# Patient Record
Sex: FEMALE | NOT HISPANIC OR LATINO | URBAN - NONMETROPOLITAN AREA
[De-identification: names, ages, dates, MRNs, and addresses within clinical notes are randomized per-mention and may not be internally consistent; named-entity substitution may affect disease eponyms.]

---

## 2021-02-08 ENCOUNTER — HOSPITAL ENCOUNTER (OUTPATIENT)
Facility: HOSPITAL | Age: 71
Discharge: HOME | End: 2021-03-15
Attending: INTERNAL MEDICINE | Admitting: INTERNAL MEDICINE

## 2021-02-08 LAB
INR PPP: 1.55 (ref 0.91–1.09)
PROTHROMBIN TIME: 18.3 SECONDS (ref 11.9–14.6)

## 2021-02-08 PROCEDURE — 85610 PROTHROMBIN TIME: CPT | Performed by: INTERNAL MEDICINE

## 2021-02-08 RX ORDER — ZOLPIDEM TARTRATE 5 MG/1
10 TABLET ORAL NIGHTLY PRN
Status: ACTIVE | OUTPATIENT
Start: 2021-02-08 | End: 2021-02-15

## 2021-02-08 RX ORDER — FLUTICASONE PROPIONATE 50 MCG
1 SPRAY, SUSPENSION (ML) NASAL 2 TIMES DAILY PRN
Status: DISCONTINUED | OUTPATIENT
Start: 2021-02-08 | End: 2021-03-15 | Stop reason: HOSPADM

## 2021-02-08 RX ORDER — PROMETHAZINE HYDROCHLORIDE 25 MG/1
12.5 TABLET ORAL EVERY 6 HOURS PRN
Status: DISCONTINUED | OUTPATIENT
Start: 2021-02-08 | End: 2021-03-15 | Stop reason: HOSPADM

## 2021-02-08 RX ORDER — HYDROCHLOROTHIAZIDE 25 MG/1
25 TABLET ORAL DAILY
Status: DISCONTINUED | OUTPATIENT
Start: 2021-02-09 | End: 2021-03-15 | Stop reason: HOSPADM

## 2021-02-08 RX ORDER — BISACODYL 10 MG
10 SUPPOSITORY, RECTAL RECTAL DAILY PRN
Status: DISCONTINUED | OUTPATIENT
Start: 2021-02-08 | End: 2021-03-15 | Stop reason: HOSPADM

## 2021-02-08 RX ORDER — HYDROXYZINE HYDROCHLORIDE 25 MG/1
50 TABLET, FILM COATED ORAL 3 TIMES DAILY PRN
Status: DISCONTINUED | OUTPATIENT
Start: 2021-02-08 | End: 2021-03-15 | Stop reason: HOSPADM

## 2021-02-08 RX ORDER — SIMVASTATIN 20 MG
20 TABLET ORAL NIGHTLY
Status: DISCONTINUED | OUTPATIENT
Start: 2021-02-08 | End: 2021-02-08 | Stop reason: ALTCHOICE

## 2021-02-08 RX ORDER — FUROSEMIDE 40 MG/1
40 TABLET ORAL DAILY
Status: DISCONTINUED | OUTPATIENT
Start: 2021-02-09 | End: 2021-03-08

## 2021-02-08 RX ORDER — POLYETHYLENE GLYCOL 3350 17 G/17G
17 POWDER, FOR SOLUTION ORAL DAILY PRN
Status: DISCONTINUED | OUTPATIENT
Start: 2021-02-08 | End: 2021-03-15 | Stop reason: HOSPADM

## 2021-02-08 RX ORDER — ALBUTEROL SULFATE 90 UG/1
2 AEROSOL, METERED RESPIRATORY (INHALATION) EVERY 4 HOURS PRN
Status: DISCONTINUED | OUTPATIENT
Start: 2021-02-08 | End: 2021-03-15 | Stop reason: HOSPADM

## 2021-02-08 RX ORDER — ESCITALOPRAM OXALATE 20 MG/1
20 TABLET ORAL DAILY
Status: DISCONTINUED | OUTPATIENT
Start: 2021-02-09 | End: 2021-03-15 | Stop reason: HOSPADM

## 2021-02-08 RX ORDER — SODIUM CHLORIDE 0.9 % (FLUSH) 0.9 %
10 SYRINGE (ML) INJECTION EVERY 12 HOURS SCHEDULED
Status: DISCONTINUED | OUTPATIENT
Start: 2021-02-08 | End: 2021-02-12

## 2021-02-08 RX ORDER — OXYMETAZOLINE HYDROCHLORIDE 0.05 G/100ML
2 SPRAY NASAL 2 TIMES DAILY PRN
Status: DISCONTINUED | OUTPATIENT
Start: 2021-02-08 | End: 2021-03-15 | Stop reason: HOSPADM

## 2021-02-08 RX ORDER — LISINOPRIL 5 MG/1
10 TABLET ORAL
Status: DISCONTINUED | OUTPATIENT
Start: 2021-02-09 | End: 2021-03-15 | Stop reason: HOSPADM

## 2021-02-08 RX ORDER — ONDANSETRON 4 MG/1
4 TABLET, ORALLY DISINTEGRATING ORAL EVERY 6 HOURS PRN
Status: DISCONTINUED | OUTPATIENT
Start: 2021-02-08 | End: 2021-03-15 | Stop reason: HOSPADM

## 2021-02-08 RX ORDER — ACETAMINOPHEN 325 MG/1
650 TABLET ORAL EVERY 4 HOURS PRN
Status: DISCONTINUED | OUTPATIENT
Start: 2021-02-08 | End: 2021-03-15 | Stop reason: HOSPADM

## 2021-02-08 RX ORDER — SODIUM CHLORIDE 0.9 % (FLUSH) 0.9 %
10 SYRINGE (ML) INJECTION AS NEEDED
Status: DISCONTINUED | OUTPATIENT
Start: 2021-02-08 | End: 2021-02-12

## 2021-02-08 RX ORDER — ONDANSETRON 2 MG/ML
4 INJECTION INTRAMUSCULAR; INTRAVENOUS EVERY 6 HOURS PRN
Status: DISCONTINUED | OUTPATIENT
Start: 2021-02-08 | End: 2021-03-15 | Stop reason: HOSPADM

## 2021-02-08 RX ORDER — ATORVASTATIN CALCIUM 10 MG/1
10 TABLET, FILM COATED ORAL DAILY
Status: DISCONTINUED | OUTPATIENT
Start: 2021-02-08 | End: 2021-03-15 | Stop reason: HOSPADM

## 2021-02-08 RX ORDER — MORPHINE SULFATE 2 MG/ML
2 INJECTION, SOLUTION INTRAMUSCULAR; INTRAVENOUS EVERY 4 HOURS PRN
Status: ACTIVE | OUTPATIENT
Start: 2021-02-08 | End: 2021-02-15

## 2021-02-08 RX ORDER — NITROGLYCERIN 0.4 MG/1
0.4 TABLET SUBLINGUAL
Status: DISCONTINUED | OUTPATIENT
Start: 2021-02-08 | End: 2021-03-15 | Stop reason: HOSPADM

## 2021-02-08 RX ORDER — AMOXICILLIN 250 MG
1 CAPSULE ORAL DAILY PRN
Status: DISCONTINUED | OUTPATIENT
Start: 2021-02-08 | End: 2021-03-15 | Stop reason: HOSPADM

## 2021-02-08 RX ORDER — LANOLIN ALCOHOL/MO/W.PET/CERES
6 CREAM (GRAM) TOPICAL NIGHTLY
Status: DISCONTINUED | OUTPATIENT
Start: 2021-02-08 | End: 2021-03-15 | Stop reason: HOSPADM

## 2021-02-08 RX ORDER — ECHINACEA PURPUREA EXTRACT 125 MG
1 TABLET ORAL DAILY PRN
Status: DISCONTINUED | OUTPATIENT
Start: 2021-02-08 | End: 2021-03-15 | Stop reason: HOSPADM

## 2021-02-09 LAB
ALBUMIN SERPL-MCNC: 3.9 G/DL (ref 3.5–5.2)
ALBUMIN/GLOB SERPL: 1.3 G/DL
ALP SERPL-CCNC: 109 U/L (ref 39–117)
ALT SERPL W P-5'-P-CCNC: 54 U/L (ref 1–33)
ANION GAP SERPL CALCULATED.3IONS-SCNC: 8 MMOL/L (ref 5–15)
AST SERPL-CCNC: 55 U/L (ref 1–32)
BASOPHILS # BLD AUTO: 0.04 10*3/MM3 (ref 0–0.2)
BASOPHILS NFR BLD AUTO: 0.7 % (ref 0–1.5)
BILIRUB SERPL-MCNC: 0.4 MG/DL (ref 0–1.2)
BUN SERPL-MCNC: 10 MG/DL (ref 8–23)
BUN/CREAT SERPL: 17.2 (ref 7–25)
CALCIUM SPEC-SCNC: 9.4 MG/DL (ref 8.6–10.5)
CHLORIDE SERPL-SCNC: 103 MMOL/L (ref 98–107)
CO2 SERPL-SCNC: 31 MMOL/L (ref 22–29)
CREAT SERPL-MCNC: 0.58 MG/DL (ref 0.57–1)
DEPRECATED RDW RBC AUTO: 53.3 FL (ref 37–54)
EOSINOPHIL # BLD AUTO: 1.02 10*3/MM3 (ref 0–0.4)
EOSINOPHIL NFR BLD AUTO: 17.2 % (ref 0.3–6.2)
ERYTHROCYTE [DISTWIDTH] IN BLOOD BY AUTOMATED COUNT: 16.3 % (ref 12.3–15.4)
GFR SERPL CREATININE-BSD FRML MDRD: 102 ML/MIN/1.73
GFR SERPL CREATININE-BSD FRML MDRD: 124 ML/MIN/1.73
GLOBULIN UR ELPH-MCNC: 3 GM/DL
GLUCOSE SERPL-MCNC: 147 MG/DL (ref 65–99)
HCT VFR BLD AUTO: 35.6 % (ref 34–46.6)
HGB BLD-MCNC: 11.5 G/DL (ref 12–15.9)
IMM GRANULOCYTES # BLD AUTO: 0.02 10*3/MM3 (ref 0–0.05)
IMM GRANULOCYTES NFR BLD AUTO: 0.3 % (ref 0–0.5)
INR PPP: 1.51 (ref 0.91–1.09)
LYMPHOCYTES # BLD AUTO: 2.38 10*3/MM3 (ref 0.7–3.1)
LYMPHOCYTES NFR BLD AUTO: 40.1 % (ref 19.6–45.3)
MCH RBC QN AUTO: 28.5 PG (ref 26.6–33)
MCHC RBC AUTO-ENTMCNC: 32.3 G/DL (ref 31.5–35.7)
MCV RBC AUTO: 88.3 FL (ref 79–97)
MONOCYTES # BLD AUTO: 0.48 10*3/MM3 (ref 0.1–0.9)
MONOCYTES NFR BLD AUTO: 8.1 % (ref 5–12)
NEUTROPHILS NFR BLD AUTO: 1.99 10*3/MM3 (ref 1.7–7)
NEUTROPHILS NFR BLD AUTO: 33.6 % (ref 42.7–76)
NRBC BLD AUTO-RTO: 0 /100 WBC (ref 0–0.2)
PLATELET # BLD AUTO: 305 10*3/MM3 (ref 140–450)
PMV BLD AUTO: 9.7 FL (ref 6–12)
POTASSIUM SERPL-SCNC: 3.6 MMOL/L (ref 3.5–5.2)
PREALB SERPL-MCNC: 21.8 MG/DL (ref 20–40)
PROT SERPL-MCNC: 6.9 G/DL (ref 6–8.5)
PROTHROMBIN TIME: 17.9 SECONDS (ref 11.9–14.6)
RBC # BLD AUTO: 4.03 10*6/MM3 (ref 3.77–5.28)
SODIUM SERPL-SCNC: 142 MMOL/L (ref 136–145)
WBC # BLD AUTO: 5.93 10*3/MM3 (ref 3.4–10.8)

## 2021-02-09 PROCEDURE — 97166 OT EVAL MOD COMPLEX 45 MIN: CPT | Performed by: OCCUPATIONAL THERAPIST

## 2021-02-09 PROCEDURE — 84134 ASSAY OF PREALBUMIN: CPT | Performed by: INTERNAL MEDICINE

## 2021-02-09 PROCEDURE — 97161 PT EVAL LOW COMPLEX 20 MIN: CPT | Performed by: PHYSICAL THERAPIST

## 2021-02-09 PROCEDURE — 97535 SELF CARE MNGMENT TRAINING: CPT | Performed by: OCCUPATIONAL THERAPIST

## 2021-02-09 PROCEDURE — 80053 COMPREHEN METABOLIC PANEL: CPT | Performed by: INTERNAL MEDICINE

## 2021-02-09 PROCEDURE — 85025 COMPLETE CBC W/AUTO DIFF WBC: CPT | Performed by: INTERNAL MEDICINE

## 2021-02-09 PROCEDURE — 85610 PROTHROMBIN TIME: CPT | Performed by: INTERNAL MEDICINE

## 2021-02-09 PROCEDURE — 97530 THERAPEUTIC ACTIVITIES: CPT | Performed by: PHYSICAL THERAPIST

## 2021-02-10 LAB
HOLD SPECIMEN: NORMAL
INR PPP: 1.7 (ref 0.91–1.09)
PROTHROMBIN TIME: 19.7 SECONDS (ref 11.9–14.6)

## 2021-02-10 PROCEDURE — 85610 PROTHROMBIN TIME: CPT | Performed by: INTERNAL MEDICINE

## 2021-02-10 PROCEDURE — 97530 THERAPEUTIC ACTIVITIES: CPT

## 2021-02-10 PROCEDURE — 97535 SELF CARE MNGMENT TRAINING: CPT

## 2021-02-11 LAB
INR PPP: 1.51 (ref 0.91–1.09)
PROTHROMBIN TIME: 17.9 SECONDS (ref 11.9–14.6)

## 2021-02-11 PROCEDURE — 97116 GAIT TRAINING THERAPY: CPT

## 2021-02-11 PROCEDURE — 85610 PROTHROMBIN TIME: CPT | Performed by: INTERNAL MEDICINE

## 2021-02-11 PROCEDURE — 97530 THERAPEUTIC ACTIVITIES: CPT | Performed by: OCCUPATIONAL THERAPIST

## 2021-02-11 PROCEDURE — 97110 THERAPEUTIC EXERCISES: CPT | Performed by: OCCUPATIONAL THERAPIST

## 2021-02-11 RX ORDER — WARFARIN SODIUM 3 MG/1
6 TABLET ORAL
Status: DISCONTINUED | OUTPATIENT
Start: 2021-02-11 | End: 2021-02-15

## 2021-02-12 LAB
INR PPP: 1.54 (ref 0.91–1.09)
PROTHROMBIN TIME: 18.2 SECONDS (ref 11.9–14.6)

## 2021-02-12 PROCEDURE — 85610 PROTHROMBIN TIME: CPT | Performed by: INTERNAL MEDICINE

## 2021-02-12 PROCEDURE — 97116 GAIT TRAINING THERAPY: CPT

## 2021-02-12 PROCEDURE — 97110 THERAPEUTIC EXERCISES: CPT

## 2021-02-13 LAB
INR PPP: 1.63 (ref 0.91–1.09)
PROTHROMBIN TIME: 19 SECONDS (ref 11.9–14.6)

## 2021-02-13 PROCEDURE — 97116 GAIT TRAINING THERAPY: CPT

## 2021-02-13 PROCEDURE — 85610 PROTHROMBIN TIME: CPT | Performed by: INTERNAL MEDICINE

## 2021-02-14 LAB
INR PPP: 1.58 (ref 0.91–1.09)
PROTHROMBIN TIME: 18.6 SECONDS (ref 11.9–14.6)

## 2021-02-14 PROCEDURE — 97116 GAIT TRAINING THERAPY: CPT

## 2021-02-14 PROCEDURE — 85610 PROTHROMBIN TIME: CPT | Performed by: INTERNAL MEDICINE

## 2021-02-15 LAB
ANION GAP SERPL CALCULATED.3IONS-SCNC: 7 MMOL/L (ref 5–15)
BASOPHILS # BLD AUTO: 0.02 10*3/MM3 (ref 0–0.2)
BASOPHILS NFR BLD AUTO: 0.4 % (ref 0–1.5)
BUN SERPL-MCNC: 19 MG/DL (ref 8–23)
BUN/CREAT SERPL: 24.1 (ref 7–25)
CALCIUM SPEC-SCNC: 9.4 MG/DL (ref 8.6–10.5)
CHLORIDE SERPL-SCNC: 95 MMOL/L (ref 98–107)
CO2 SERPL-SCNC: 36 MMOL/L (ref 22–29)
CREAT SERPL-MCNC: 0.79 MG/DL (ref 0.57–1)
DEPRECATED RDW RBC AUTO: 50.4 FL (ref 37–54)
EOSINOPHIL # BLD AUTO: 0.57 10*3/MM3 (ref 0–0.4)
EOSINOPHIL NFR BLD AUTO: 12.1 % (ref 0.3–6.2)
ERYTHROCYTE [DISTWIDTH] IN BLOOD BY AUTOMATED COUNT: 15.5 % (ref 12.3–15.4)
GFR SERPL CREATININE-BSD FRML MDRD: 72 ML/MIN/1.73
GFR SERPL CREATININE-BSD FRML MDRD: 87 ML/MIN/1.73
GLUCOSE SERPL-MCNC: 128 MG/DL (ref 65–99)
HCT VFR BLD AUTO: 34.3 % (ref 34–46.6)
HGB BLD-MCNC: 11 G/DL (ref 12–15.9)
IMM GRANULOCYTES # BLD AUTO: 0.03 10*3/MM3 (ref 0–0.05)
IMM GRANULOCYTES NFR BLD AUTO: 0.6 % (ref 0–0.5)
INR PPP: 1.56 (ref 0.91–1.09)
LYMPHOCYTES # BLD AUTO: 1.85 10*3/MM3 (ref 0.7–3.1)
LYMPHOCYTES NFR BLD AUTO: 39.4 % (ref 19.6–45.3)
MCH RBC QN AUTO: 28.4 PG (ref 26.6–33)
MCHC RBC AUTO-ENTMCNC: 32.1 G/DL (ref 31.5–35.7)
MCV RBC AUTO: 88.4 FL (ref 79–97)
MONOCYTES # BLD AUTO: 0.59 10*3/MM3 (ref 0.1–0.9)
MONOCYTES NFR BLD AUTO: 12.6 % (ref 5–12)
NEUTROPHILS NFR BLD AUTO: 1.64 10*3/MM3 (ref 1.7–7)
NEUTROPHILS NFR BLD AUTO: 34.9 % (ref 42.7–76)
NRBC BLD AUTO-RTO: 0 /100 WBC (ref 0–0.2)
NT-PROBNP SERPL-MCNC: 143.4 PG/ML (ref 0–900)
PLATELET # BLD AUTO: 268 10*3/MM3 (ref 140–450)
PMV BLD AUTO: 10.4 FL (ref 6–12)
POTASSIUM SERPL-SCNC: 2.9 MMOL/L (ref 3.5–5.2)
PROTHROMBIN TIME: 18.4 SECONDS (ref 11.9–14.6)
RBC # BLD AUTO: 3.88 10*6/MM3 (ref 3.77–5.28)
SODIUM SERPL-SCNC: 138 MMOL/L (ref 136–145)
WBC # BLD AUTO: 4.7 10*3/MM3 (ref 3.4–10.8)

## 2021-02-15 PROCEDURE — 80048 BASIC METABOLIC PNL TOTAL CA: CPT | Performed by: INTERNAL MEDICINE

## 2021-02-15 PROCEDURE — 97116 GAIT TRAINING THERAPY: CPT

## 2021-02-15 PROCEDURE — 97535 SELF CARE MNGMENT TRAINING: CPT

## 2021-02-15 PROCEDURE — 85025 COMPLETE CBC W/AUTO DIFF WBC: CPT | Performed by: INTERNAL MEDICINE

## 2021-02-15 PROCEDURE — 85610 PROTHROMBIN TIME: CPT | Performed by: INTERNAL MEDICINE

## 2021-02-15 PROCEDURE — 97530 THERAPEUTIC ACTIVITIES: CPT

## 2021-02-15 PROCEDURE — 83880 ASSAY OF NATRIURETIC PEPTIDE: CPT | Performed by: INTERNAL MEDICINE

## 2021-02-15 RX ORDER — WARFARIN SODIUM 3 MG/1
7.5 TABLET ORAL
Status: DISCONTINUED | OUTPATIENT
Start: 2021-02-15 | End: 2021-02-18 | Stop reason: DRUGHIGH

## 2021-02-16 LAB
INR PPP: 1.64 (ref 0.91–1.09)
PROTHROMBIN TIME: 19.1 SECONDS (ref 11.9–14.6)

## 2021-02-16 PROCEDURE — 97110 THERAPEUTIC EXERCISES: CPT

## 2021-02-16 PROCEDURE — 97116 GAIT TRAINING THERAPY: CPT

## 2021-02-16 PROCEDURE — 85610 PROTHROMBIN TIME: CPT | Performed by: INTERNAL MEDICINE

## 2021-02-17 LAB
ANION GAP SERPL CALCULATED.3IONS-SCNC: 8 MMOL/L (ref 5–15)
BUN SERPL-MCNC: 20 MG/DL (ref 8–23)
BUN/CREAT SERPL: 25 (ref 7–25)
CALCIUM SPEC-SCNC: 9.4 MG/DL (ref 8.6–10.5)
CHLORIDE SERPL-SCNC: 94 MMOL/L (ref 98–107)
CO2 SERPL-SCNC: 37 MMOL/L (ref 22–29)
CREAT SERPL-MCNC: 0.8 MG/DL (ref 0.57–1)
GFR SERPL CREATININE-BSD FRML MDRD: 71 ML/MIN/1.73
GFR SERPL CREATININE-BSD FRML MDRD: 86 ML/MIN/1.73
GLUCOSE SERPL-MCNC: 113 MG/DL (ref 65–99)
INR PPP: 1.59 (ref 0.91–1.09)
MAGNESIUM SERPL-MCNC: 2.1 MG/DL (ref 1.6–2.4)
NT-PROBNP SERPL-MCNC: 166.5 PG/ML (ref 0–900)
POTASSIUM SERPL-SCNC: 3.2 MMOL/L (ref 3.5–5.2)
PROTHROMBIN TIME: 18.7 SECONDS (ref 11.9–14.6)
SODIUM SERPL-SCNC: 139 MMOL/L (ref 136–145)

## 2021-02-17 PROCEDURE — 83735 ASSAY OF MAGNESIUM: CPT | Performed by: INTERNAL MEDICINE

## 2021-02-17 PROCEDURE — 85610 PROTHROMBIN TIME: CPT | Performed by: INTERNAL MEDICINE

## 2021-02-17 PROCEDURE — 80048 BASIC METABOLIC PNL TOTAL CA: CPT | Performed by: INTERNAL MEDICINE

## 2021-02-17 PROCEDURE — 97116 GAIT TRAINING THERAPY: CPT

## 2021-02-17 PROCEDURE — 83880 ASSAY OF NATRIURETIC PEPTIDE: CPT | Performed by: INTERNAL MEDICINE

## 2021-02-17 PROCEDURE — 97530 THERAPEUTIC ACTIVITIES: CPT

## 2021-02-17 PROCEDURE — 97110 THERAPEUTIC EXERCISES: CPT

## 2021-02-17 RX ORDER — POTASSIUM CHLORIDE 750 MG/1
40 CAPSULE, EXTENDED RELEASE ORAL
Status: DISPENSED | OUTPATIENT
Start: 2021-02-17 | End: 2021-02-18

## 2021-02-18 LAB
ANION GAP SERPL CALCULATED.3IONS-SCNC: 8 MMOL/L (ref 5–15)
BASOPHILS # BLD AUTO: 0.03 10*3/MM3 (ref 0–0.2)
BASOPHILS NFR BLD AUTO: 0.7 % (ref 0–1.5)
BUN SERPL-MCNC: 20 MG/DL (ref 8–23)
BUN/CREAT SERPL: 27.8 (ref 7–25)
CALCIUM SPEC-SCNC: 9.4 MG/DL (ref 8.6–10.5)
CHLORIDE SERPL-SCNC: 98 MMOL/L (ref 98–107)
CO2 SERPL-SCNC: 34 MMOL/L (ref 22–29)
CREAT SERPL-MCNC: 0.72 MG/DL (ref 0.57–1)
DEPRECATED RDW RBC AUTO: 48.3 FL (ref 37–54)
EOSINOPHIL # BLD AUTO: 0.39 10*3/MM3 (ref 0–0.4)
EOSINOPHIL NFR BLD AUTO: 8.8 % (ref 0.3–6.2)
ERYTHROCYTE [DISTWIDTH] IN BLOOD BY AUTOMATED COUNT: 15.1 % (ref 12.3–15.4)
GFR SERPL CREATININE-BSD FRML MDRD: 80 ML/MIN/1.73
GFR SERPL CREATININE-BSD FRML MDRD: 97 ML/MIN/1.73
GLUCOSE SERPL-MCNC: 100 MG/DL (ref 65–99)
HCT VFR BLD AUTO: 32.8 % (ref 34–46.6)
HGB BLD-MCNC: 10.7 G/DL (ref 12–15.9)
IMM GRANULOCYTES # BLD AUTO: 0.01 10*3/MM3 (ref 0–0.05)
IMM GRANULOCYTES NFR BLD AUTO: 0.2 % (ref 0–0.5)
INR PPP: 1.57 (ref 0.91–1.09)
LYMPHOCYTES # BLD AUTO: 2.03 10*3/MM3 (ref 0.7–3.1)
LYMPHOCYTES NFR BLD AUTO: 45.6 % (ref 19.6–45.3)
MCH RBC QN AUTO: 28.5 PG (ref 26.6–33)
MCHC RBC AUTO-ENTMCNC: 32.6 G/DL (ref 31.5–35.7)
MCV RBC AUTO: 87.2 FL (ref 79–97)
MONOCYTES # BLD AUTO: 0.5 10*3/MM3 (ref 0.1–0.9)
MONOCYTES NFR BLD AUTO: 11.2 % (ref 5–12)
NEUTROPHILS NFR BLD AUTO: 1.49 10*3/MM3 (ref 1.7–7)
NEUTROPHILS NFR BLD AUTO: 33.5 % (ref 42.7–76)
NRBC BLD AUTO-RTO: 0 /100 WBC (ref 0–0.2)
PLATELET # BLD AUTO: 230 10*3/MM3 (ref 140–450)
PMV BLD AUTO: 10.7 FL (ref 6–12)
POTASSIUM SERPL-SCNC: 3.2 MMOL/L (ref 3.5–5.2)
PROTHROMBIN TIME: 18.5 SECONDS (ref 11.9–14.6)
RBC # BLD AUTO: 3.76 10*6/MM3 (ref 3.77–5.28)
SODIUM SERPL-SCNC: 140 MMOL/L (ref 136–145)
WBC # BLD AUTO: 4.45 10*3/MM3 (ref 3.4–10.8)

## 2021-02-18 PROCEDURE — 97530 THERAPEUTIC ACTIVITIES: CPT

## 2021-02-18 PROCEDURE — 97110 THERAPEUTIC EXERCISES: CPT

## 2021-02-18 PROCEDURE — 85610 PROTHROMBIN TIME: CPT | Performed by: INTERNAL MEDICINE

## 2021-02-18 PROCEDURE — 85025 COMPLETE CBC W/AUTO DIFF WBC: CPT | Performed by: INTERNAL MEDICINE

## 2021-02-18 PROCEDURE — 80048 BASIC METABOLIC PNL TOTAL CA: CPT | Performed by: INTERNAL MEDICINE

## 2021-02-18 PROCEDURE — 97116 GAIT TRAINING THERAPY: CPT

## 2021-02-18 RX ORDER — POTASSIUM CHLORIDE 750 MG/1
20 CAPSULE, EXTENDED RELEASE ORAL DAILY
Status: DISCONTINUED | OUTPATIENT
Start: 2021-02-19 | End: 2021-02-25

## 2021-02-18 RX ORDER — WARFARIN SODIUM 7.5 MG/1
7.5 TABLET ORAL
Status: DISCONTINUED | OUTPATIENT
Start: 2021-02-19 | End: 2021-03-15 | Stop reason: HOSPADM

## 2021-02-18 RX ORDER — POTASSIUM CHLORIDE 750 MG/1
40 CAPSULE, EXTENDED RELEASE ORAL
Status: DISPENSED | OUTPATIENT
Start: 2021-02-18 | End: 2021-02-19

## 2021-02-19 LAB
ANION GAP SERPL CALCULATED.3IONS-SCNC: 9 MMOL/L (ref 5–15)
BUN SERPL-MCNC: 21 MG/DL (ref 8–23)
BUN/CREAT SERPL: 30 (ref 7–25)
CALCIUM SPEC-SCNC: 9 MG/DL (ref 8.6–10.5)
CHLORIDE SERPL-SCNC: 100 MMOL/L (ref 98–107)
CO2 SERPL-SCNC: 33 MMOL/L (ref 22–29)
CREAT SERPL-MCNC: 0.7 MG/DL (ref 0.57–1)
GFR SERPL CREATININE-BSD FRML MDRD: 100 ML/MIN/1.73
GFR SERPL CREATININE-BSD FRML MDRD: 82 ML/MIN/1.73
GLUCOSE SERPL-MCNC: 102 MG/DL (ref 65–99)
INR PPP: 1.82 (ref 0.91–1.09)
MAGNESIUM SERPL-MCNC: 2 MG/DL (ref 1.6–2.4)
POTASSIUM SERPL-SCNC: 3.5 MMOL/L (ref 3.5–5.2)
PROTHROMBIN TIME: 20.8 SECONDS (ref 11.9–14.6)
SODIUM SERPL-SCNC: 142 MMOL/L (ref 136–145)

## 2021-02-19 PROCEDURE — 83735 ASSAY OF MAGNESIUM: CPT | Performed by: INTERNAL MEDICINE

## 2021-02-19 PROCEDURE — 80048 BASIC METABOLIC PNL TOTAL CA: CPT | Performed by: INTERNAL MEDICINE

## 2021-02-19 PROCEDURE — 85610 PROTHROMBIN TIME: CPT | Performed by: INTERNAL MEDICINE

## 2021-02-19 PROCEDURE — 97535 SELF CARE MNGMENT TRAINING: CPT

## 2021-02-19 PROCEDURE — 97116 GAIT TRAINING THERAPY: CPT

## 2021-02-19 PROCEDURE — 97530 THERAPEUTIC ACTIVITIES: CPT

## 2021-02-20 LAB
HOLD SPECIMEN: NORMAL
INR PPP: 2.01 (ref 0.91–1.09)
PROTHROMBIN TIME: 22.6 SECONDS (ref 11.9–14.6)
WHOLE BLOOD HOLD SPECIMEN: NORMAL

## 2021-02-20 PROCEDURE — 97116 GAIT TRAINING THERAPY: CPT

## 2021-02-20 PROCEDURE — 85610 PROTHROMBIN TIME: CPT | Performed by: INTERNAL MEDICINE

## 2021-02-21 LAB
HOLD SPECIMEN: NORMAL
INR PPP: 2.22 (ref 0.91–1.09)
PROTHROMBIN TIME: 24.5 SECONDS (ref 11.9–14.6)
WHOLE BLOOD HOLD SPECIMEN: NORMAL

## 2021-02-21 PROCEDURE — 97116 GAIT TRAINING THERAPY: CPT

## 2021-02-21 PROCEDURE — 85610 PROTHROMBIN TIME: CPT | Performed by: INTERNAL MEDICINE

## 2021-02-22 LAB
ANION GAP SERPL CALCULATED.3IONS-SCNC: 8 MMOL/L (ref 5–15)
BASOPHILS # BLD AUTO: 0.04 10*3/MM3 (ref 0–0.2)
BASOPHILS NFR BLD AUTO: 0.9 % (ref 0–1.5)
BUN SERPL-MCNC: 17 MG/DL (ref 8–23)
BUN/CREAT SERPL: 26.2 (ref 7–25)
CALCIUM SPEC-SCNC: 8.7 MG/DL (ref 8.6–10.5)
CHLORIDE SERPL-SCNC: 99 MMOL/L (ref 98–107)
CO2 SERPL-SCNC: 33 MMOL/L (ref 22–29)
CREAT SERPL-MCNC: 0.65 MG/DL (ref 0.57–1)
DEPRECATED RDW RBC AUTO: 43.8 FL (ref 37–54)
EOSINOPHIL # BLD AUTO: 0.37 10*3/MM3 (ref 0–0.4)
EOSINOPHIL NFR BLD AUTO: 8.2 % (ref 0.3–6.2)
ERYTHROCYTE [DISTWIDTH] IN BLOOD BY AUTOMATED COUNT: 14.8 % (ref 12.3–15.4)
GFR SERPL CREATININE-BSD FRML MDRD: 109 ML/MIN/1.73
GFR SERPL CREATININE-BSD FRML MDRD: 90 ML/MIN/1.73
GLUCOSE SERPL-MCNC: 111 MG/DL (ref 65–99)
HCT VFR BLD AUTO: 30 % (ref 34–46.6)
HGB BLD-MCNC: 10.8 G/DL (ref 12–15.9)
IMM GRANULOCYTES # BLD AUTO: 0.02 10*3/MM3 (ref 0–0.05)
IMM GRANULOCYTES NFR BLD AUTO: 0.4 % (ref 0–0.5)
INR PPP: 2.32 (ref 0.91–1.09)
LYMPHOCYTES # BLD AUTO: 2.08 10*3/MM3 (ref 0.7–3.1)
LYMPHOCYTES NFR BLD AUTO: 46.3 % (ref 19.6–45.3)
MCH RBC QN AUTO: 29 PG (ref 26.6–33)
MCHC RBC AUTO-ENTMCNC: 36 G/DL (ref 31.5–35.7)
MCV RBC AUTO: 80.6 FL (ref 79–97)
MONOCYTES # BLD AUTO: 0.48 10*3/MM3 (ref 0.1–0.9)
MONOCYTES NFR BLD AUTO: 10.7 % (ref 5–12)
NEUTROPHILS NFR BLD AUTO: 1.5 10*3/MM3 (ref 1.7–7)
NEUTROPHILS NFR BLD AUTO: 33.5 % (ref 42.7–76)
NRBC BLD AUTO-RTO: 0 /100 WBC (ref 0–0.2)
NT-PROBNP SERPL-MCNC: 200.2 PG/ML (ref 0–900)
PLATELET # BLD AUTO: 199 10*3/MM3 (ref 140–450)
PMV BLD AUTO: 10.5 FL (ref 6–12)
POTASSIUM SERPL-SCNC: 3.6 MMOL/L (ref 3.5–5.2)
PROTHROMBIN TIME: 25.4 SECONDS (ref 11.9–14.6)
RBC # BLD AUTO: 3.72 10*6/MM3 (ref 3.77–5.28)
SODIUM SERPL-SCNC: 140 MMOL/L (ref 136–145)
WBC # BLD AUTO: 4.49 10*3/MM3 (ref 3.4–10.8)

## 2021-02-22 PROCEDURE — 97116 GAIT TRAINING THERAPY: CPT

## 2021-02-22 PROCEDURE — 97530 THERAPEUTIC ACTIVITIES: CPT

## 2021-02-22 PROCEDURE — 80048 BASIC METABOLIC PNL TOTAL CA: CPT | Performed by: INTERNAL MEDICINE

## 2021-02-22 PROCEDURE — 97168 OT RE-EVAL EST PLAN CARE: CPT | Performed by: OCCUPATIONAL THERAPIST

## 2021-02-22 PROCEDURE — 83880 ASSAY OF NATRIURETIC PEPTIDE: CPT | Performed by: INTERNAL MEDICINE

## 2021-02-22 PROCEDURE — 97535 SELF CARE MNGMENT TRAINING: CPT

## 2021-02-22 PROCEDURE — 97110 THERAPEUTIC EXERCISES: CPT

## 2021-02-22 PROCEDURE — 97535 SELF CARE MNGMENT TRAINING: CPT | Performed by: OCCUPATIONAL THERAPIST

## 2021-02-22 PROCEDURE — 85025 COMPLETE CBC W/AUTO DIFF WBC: CPT | Performed by: INTERNAL MEDICINE

## 2021-02-22 PROCEDURE — 85610 PROTHROMBIN TIME: CPT | Performed by: INTERNAL MEDICINE

## 2021-02-23 LAB
INR PPP: 2.2 (ref 0.91–1.09)
PROTHROMBIN TIME: 24.3 SECONDS (ref 11.9–14.6)

## 2021-02-23 PROCEDURE — 97110 THERAPEUTIC EXERCISES: CPT

## 2021-02-23 PROCEDURE — 97535 SELF CARE MNGMENT TRAINING: CPT

## 2021-02-23 PROCEDURE — 85610 PROTHROMBIN TIME: CPT | Performed by: INTERNAL MEDICINE

## 2021-02-23 PROCEDURE — 97116 GAIT TRAINING THERAPY: CPT

## 2021-02-23 PROCEDURE — 97530 THERAPEUTIC ACTIVITIES: CPT

## 2021-02-24 LAB
INR PPP: 2.38 (ref 0.91–1.09)
PROTHROMBIN TIME: 25.9 SECONDS (ref 11.9–14.6)

## 2021-02-24 PROCEDURE — 97164 PT RE-EVAL EST PLAN CARE: CPT | Performed by: PHYSICAL THERAPIST

## 2021-02-24 PROCEDURE — 97116 GAIT TRAINING THERAPY: CPT | Performed by: PHYSICAL THERAPIST

## 2021-02-24 PROCEDURE — 97530 THERAPEUTIC ACTIVITIES: CPT

## 2021-02-24 PROCEDURE — 85610 PROTHROMBIN TIME: CPT | Performed by: INTERNAL MEDICINE

## 2021-02-24 PROCEDURE — 97110 THERAPEUTIC EXERCISES: CPT

## 2021-02-25 LAB
ANION GAP SERPL CALCULATED.3IONS-SCNC: 10 MMOL/L (ref 5–15)
BASOPHILS # BLD AUTO: 0.04 10*3/MM3 (ref 0–0.2)
BASOPHILS NFR BLD AUTO: 0.8 % (ref 0–1.5)
BUN SERPL-MCNC: 20 MG/DL (ref 8–23)
BUN/CREAT SERPL: 24.1 (ref 7–25)
CALCIUM SPEC-SCNC: 8.9 MG/DL (ref 8.6–10.5)
CHLORIDE SERPL-SCNC: 96 MMOL/L (ref 98–107)
CO2 SERPL-SCNC: 31 MMOL/L (ref 22–29)
CREAT SERPL-MCNC: 0.83 MG/DL (ref 0.57–1)
DEPRECATED RDW RBC AUTO: 45.5 FL (ref 37–54)
EOSINOPHIL # BLD AUTO: 0.44 10*3/MM3 (ref 0–0.4)
EOSINOPHIL NFR BLD AUTO: 8.6 % (ref 0.3–6.2)
ERYTHROCYTE [DISTWIDTH] IN BLOOD BY AUTOMATED COUNT: 14.6 % (ref 12.3–15.4)
GFR SERPL CREATININE-BSD FRML MDRD: 68 ML/MIN/1.73
GFR SERPL CREATININE-BSD FRML MDRD: 82 ML/MIN/1.73
GLUCOSE SERPL-MCNC: 118 MG/DL (ref 65–99)
HCT VFR BLD AUTO: 31.1 % (ref 34–46.6)
HGB BLD-MCNC: 10.5 G/DL (ref 12–15.9)
IMM GRANULOCYTES # BLD AUTO: 0.02 10*3/MM3 (ref 0–0.05)
IMM GRANULOCYTES NFR BLD AUTO: 0.4 % (ref 0–0.5)
INR PPP: 2.47 (ref 0.91–1.09)
LYMPHOCYTES # BLD AUTO: 2.14 10*3/MM3 (ref 0.7–3.1)
LYMPHOCYTES NFR BLD AUTO: 41.6 % (ref 19.6–45.3)
MCH RBC QN AUTO: 28.8 PG (ref 26.6–33)
MCHC RBC AUTO-ENTMCNC: 33.8 G/DL (ref 31.5–35.7)
MCV RBC AUTO: 85.2 FL (ref 79–97)
MONOCYTES # BLD AUTO: 0.57 10*3/MM3 (ref 0.1–0.9)
MONOCYTES NFR BLD AUTO: 11.1 % (ref 5–12)
NEUTROPHILS NFR BLD AUTO: 1.93 10*3/MM3 (ref 1.7–7)
NEUTROPHILS NFR BLD AUTO: 37.5 % (ref 42.7–76)
NRBC BLD AUTO-RTO: 0 /100 WBC (ref 0–0.2)
NT-PROBNP SERPL-MCNC: 191.7 PG/ML (ref 0–900)
PLATELET # BLD AUTO: 207 10*3/MM3 (ref 140–450)
PMV BLD AUTO: 10.9 FL (ref 6–12)
POTASSIUM SERPL-SCNC: 3.2 MMOL/L (ref 3.5–5.2)
PROTHROMBIN TIME: 26.7 SECONDS (ref 11.9–14.6)
RBC # BLD AUTO: 3.65 10*6/MM3 (ref 3.77–5.28)
SODIUM SERPL-SCNC: 137 MMOL/L (ref 136–145)
WBC # BLD AUTO: 5.14 10*3/MM3 (ref 3.4–10.8)

## 2021-02-25 PROCEDURE — 85025 COMPLETE CBC W/AUTO DIFF WBC: CPT | Performed by: INTERNAL MEDICINE

## 2021-02-25 PROCEDURE — 97535 SELF CARE MNGMENT TRAINING: CPT

## 2021-02-25 PROCEDURE — 83880 ASSAY OF NATRIURETIC PEPTIDE: CPT | Performed by: INTERNAL MEDICINE

## 2021-02-25 PROCEDURE — 97530 THERAPEUTIC ACTIVITIES: CPT

## 2021-02-25 PROCEDURE — 85610 PROTHROMBIN TIME: CPT | Performed by: INTERNAL MEDICINE

## 2021-02-25 PROCEDURE — 97116 GAIT TRAINING THERAPY: CPT

## 2021-02-25 PROCEDURE — 80048 BASIC METABOLIC PNL TOTAL CA: CPT | Performed by: INTERNAL MEDICINE

## 2021-02-25 RX ORDER — POTASSIUM CHLORIDE 750 MG/1
40 CAPSULE, EXTENDED RELEASE ORAL DAILY
Status: DISCONTINUED | OUTPATIENT
Start: 2021-02-26 | End: 2021-03-11

## 2021-02-25 RX ORDER — POTASSIUM CHLORIDE 750 MG/1
40 CAPSULE, EXTENDED RELEASE ORAL
Status: DISPENSED | OUTPATIENT
Start: 2021-02-25 | End: 2021-02-25

## 2021-02-26 LAB
ANION GAP SERPL CALCULATED.3IONS-SCNC: 10 MMOL/L (ref 5–15)
BUN SERPL-MCNC: 17 MG/DL (ref 8–23)
BUN/CREAT SERPL: 23.3 (ref 7–25)
CALCIUM SPEC-SCNC: 9.2 MG/DL (ref 8.6–10.5)
CHLORIDE SERPL-SCNC: 101 MMOL/L (ref 98–107)
CO2 SERPL-SCNC: 28 MMOL/L (ref 22–29)
CREAT SERPL-MCNC: 0.73 MG/DL (ref 0.57–1)
GFR SERPL CREATININE-BSD FRML MDRD: 79 ML/MIN/1.73
GFR SERPL CREATININE-BSD FRML MDRD: 95 ML/MIN/1.73
GLUCOSE SERPL-MCNC: 100 MG/DL (ref 65–99)
INR PPP: 2.52 (ref 0.91–1.09)
MAGNESIUM SERPL-MCNC: 1.9 MG/DL (ref 1.6–2.4)
POTASSIUM SERPL-SCNC: 3.6 MMOL/L (ref 3.5–5.2)
PROTHROMBIN TIME: 27.1 SECONDS (ref 11.9–14.6)
SODIUM SERPL-SCNC: 139 MMOL/L (ref 136–145)
WHOLE BLOOD HOLD SPECIMEN: NORMAL

## 2021-02-26 PROCEDURE — 97116 GAIT TRAINING THERAPY: CPT

## 2021-02-26 PROCEDURE — 97110 THERAPEUTIC EXERCISES: CPT

## 2021-02-26 PROCEDURE — 85610 PROTHROMBIN TIME: CPT | Performed by: INTERNAL MEDICINE

## 2021-02-26 PROCEDURE — 83735 ASSAY OF MAGNESIUM: CPT | Performed by: INTERNAL MEDICINE

## 2021-02-26 PROCEDURE — 80048 BASIC METABOLIC PNL TOTAL CA: CPT | Performed by: INTERNAL MEDICINE

## 2021-02-26 PROCEDURE — 97530 THERAPEUTIC ACTIVITIES: CPT

## 2021-02-27 LAB
INR PPP: 2.35 (ref 0.91–1.09)
PROTHROMBIN TIME: 25.6 SECONDS (ref 11.9–14.6)

## 2021-02-27 PROCEDURE — 97116 GAIT TRAINING THERAPY: CPT

## 2021-02-27 PROCEDURE — 85610 PROTHROMBIN TIME: CPT | Performed by: INTERNAL MEDICINE

## 2021-02-28 LAB
INR PPP: 2.3 (ref 0.91–1.09)
PROTHROMBIN TIME: 25.2 SECONDS (ref 11.9–14.6)

## 2021-02-28 PROCEDURE — 85610 PROTHROMBIN TIME: CPT | Performed by: INTERNAL MEDICINE

## 2021-02-28 PROCEDURE — 97116 GAIT TRAINING THERAPY: CPT

## 2021-03-01 LAB
ANION GAP SERPL CALCULATED.3IONS-SCNC: 9 MMOL/L (ref 5–15)
BASOPHILS # BLD AUTO: 0.04 10*3/MM3 (ref 0–0.2)
BASOPHILS NFR BLD AUTO: 0.8 % (ref 0–1.5)
BUN SERPL-MCNC: 18 MG/DL (ref 8–23)
BUN/CREAT SERPL: 26.1 (ref 7–25)
CALCIUM SPEC-SCNC: 9.1 MG/DL (ref 8.6–10.5)
CHLORIDE SERPL-SCNC: 100 MMOL/L (ref 98–107)
CO2 SERPL-SCNC: 30 MMOL/L (ref 22–29)
CREAT SERPL-MCNC: 0.69 MG/DL (ref 0.57–1)
DEPRECATED RDW RBC AUTO: 43.8 FL (ref 37–54)
EOSINOPHIL # BLD AUTO: 0.44 10*3/MM3 (ref 0–0.4)
EOSINOPHIL NFR BLD AUTO: 8.9 % (ref 0.3–6.2)
ERYTHROCYTE [DISTWIDTH] IN BLOOD BY AUTOMATED COUNT: 14.6 % (ref 12.3–15.4)
GFR SERPL CREATININE-BSD FRML MDRD: 102 ML/MIN/1.73
GFR SERPL CREATININE-BSD FRML MDRD: 84 ML/MIN/1.73
GLUCOSE SERPL-MCNC: 109 MG/DL (ref 65–99)
HCT VFR BLD AUTO: 31.5 % (ref 34–46.6)
HGB BLD-MCNC: 10.9 G/DL (ref 12–15.9)
IMM GRANULOCYTES # BLD AUTO: 0.02 10*3/MM3 (ref 0–0.05)
IMM GRANULOCYTES NFR BLD AUTO: 0.4 % (ref 0–0.5)
INR PPP: 2.26 (ref 0.91–1.09)
LYMPHOCYTES # BLD AUTO: 2.23 10*3/MM3 (ref 0.7–3.1)
LYMPHOCYTES NFR BLD AUTO: 45.3 % (ref 19.6–45.3)
MCH RBC QN AUTO: 28.3 PG (ref 26.6–33)
MCHC RBC AUTO-ENTMCNC: 34.6 G/DL (ref 31.5–35.7)
MCV RBC AUTO: 81.8 FL (ref 79–97)
MONOCYTES # BLD AUTO: 0.51 10*3/MM3 (ref 0.1–0.9)
MONOCYTES NFR BLD AUTO: 10.4 % (ref 5–12)
NEUTROPHILS NFR BLD AUTO: 1.68 10*3/MM3 (ref 1.7–7)
NEUTROPHILS NFR BLD AUTO: 34.2 % (ref 42.7–76)
NRBC BLD AUTO-RTO: 0 /100 WBC (ref 0–0.2)
NT-PROBNP SERPL-MCNC: 177.5 PG/ML (ref 0–900)
PLATELET # BLD AUTO: 194 10*3/MM3 (ref 140–450)
PMV BLD AUTO: 10.1 FL (ref 6–12)
POTASSIUM SERPL-SCNC: 3.5 MMOL/L (ref 3.5–5.2)
PROTHROMBIN TIME: 24.8 SECONDS (ref 11.9–14.6)
RBC # BLD AUTO: 3.85 10*6/MM3 (ref 3.77–5.28)
SODIUM SERPL-SCNC: 139 MMOL/L (ref 136–145)
WBC # BLD AUTO: 4.92 10*3/MM3 (ref 3.4–10.8)

## 2021-03-01 PROCEDURE — 83880 ASSAY OF NATRIURETIC PEPTIDE: CPT | Performed by: INTERNAL MEDICINE

## 2021-03-01 PROCEDURE — 97530 THERAPEUTIC ACTIVITIES: CPT

## 2021-03-01 PROCEDURE — 80048 BASIC METABOLIC PNL TOTAL CA: CPT | Performed by: INTERNAL MEDICINE

## 2021-03-01 PROCEDURE — 85025 COMPLETE CBC W/AUTO DIFF WBC: CPT | Performed by: INTERNAL MEDICINE

## 2021-03-01 PROCEDURE — 97110 THERAPEUTIC EXERCISES: CPT

## 2021-03-01 PROCEDURE — 85610 PROTHROMBIN TIME: CPT | Performed by: INTERNAL MEDICINE

## 2021-03-01 PROCEDURE — 97116 GAIT TRAINING THERAPY: CPT

## 2021-03-01 NOTE — PROGRESS NOTES
Evan Gordon M.D.  ADDY Nielson        Internal Medicine Progress Note    3/1/2021   10:46 CST    Name:  Leo Wong  MRN:    [id number]     Acct:     [id number]   Room:  [address] [place] [address] Day: 0     Admit Date: 2/8/2021  5:26 PM  PCP: Provider, No Known    Subjective:     C/C: weakness, shortness of breath    Interval History: Status: stayed the same. Up to side of bed.  No family at bedside. Progressing with therapy. Afebrile. 02 sats stable with 02 at 3lpm with rest and requiring up to 6 lpm with activity which is a fairly significant improvement. INR therapeutic and counts otherwise stable. Hopeful for transfer to rehab facility prior to her planned return to home.     m to maintain adequate 02 sats with activity.Review of Systems   Constitution: Positive for malaise/fatigue. Negative for chills, fever and weight loss.   HENT: Negative for congestion, hoarse voice and nosebleeds.    Eyes: Negative for blurred vision and photophobia.   Cardiovascular: Positive for dyspnea on exertion. Negative for chest pain, near-syncope and orthopnea.   Respiratory: Positive for shortness of breath. Negative for cough and sputum production.    Endocrine: Negative for cold intolerance and polyuria.   Hematologic/Lymphatic: Negative for adenopathy and bleeding problem.   Skin: Negative for dry skin, itching and rash.   Musculoskeletal: Negative for arthritis, back pain and joint pain.   Gastrointestinal: Negative for abdominal pain, diarrhea, heartburn, nausea and vomiting.   Genitourinary: Negative for dysuria and flank pain.   Neurological: Positive for weakness. Negative for dizziness, headaches, paresthesias and seizures.   Psychiatric/Behavioral: Negative for altered mental status and memory loss. The patient is not nervous/anxious.    Allergic/Immunologic: Negative for environmental allergies and hives.         Medications:     Allergies:   Allergies   Allergen Reactions   • Poison Ivy Extract Unknown  - Low Severity       Current Meds:   Current Facility-Administered Medications:   •  acetaminophen (TYLENOL) tablet 650 mg, 650 mg, Oral, Q4H PRN, Dionte Gordon MD  •  albuterol sulfate HFA (PROVENTIL HFA;VENTOLIN HFA;PROAIR HFA) inhaler 2 puff, 2 puff, Inhalation, Q4H PRN, Dionte Gordon MD  •  atorvastatin (LIPITOR) tablet 10 mg, 10 mg, Oral, Daily, Dionte Gordon MD  •  benzocaine-menthol (CHLORASEPTIC) lozenge 1 lozenge, 1 lozenge, Mouth/Throat, Q2H PRN, Dionte Gordon MD  •  bisacodyl (DULCOLAX) suppository 10 mg, 10 mg, Rectal, Daily PRN, Dionte Gordon MD  •  escitalopram (LEXAPRO) tablet 20 mg, 20 mg, Oral, Daily, Dionte Gordon MD  •  fluticasone (FLONASE) 50 MCG/ACT nasal spray 1 spray, 1 spray, Each Nare, BID PRN, Dionte Gordon MD  •  furosemide (LASIX) tablet 40 mg, 40 mg, Oral, Daily, Dionte Gordon MD  •  guaiFENesin (ROBITUSSIN) 100 MG/5ML oral solution 200 mg, 200 mg, Oral, Q4H PRN, Dionte Gordon MD  •  hydroCHLOROthiazide (HYDRODIURIL) tablet 25 mg, 25 mg, Oral, Daily, Dionte Gordon MD  •  hydrOXYzine (ATARAX) tablet 50 mg, 50 mg, Oral, TID PRN, Dionte Gordon MD  •  lisinopril (PRINIVIL,ZESTRIL) tablet 10 mg, 10 mg, Oral, Q24H, Dionte Gordon MD  •  melatonin tablet 6 mg, 6 mg, Oral, Nightly, Dionte Gordon MD  •  nitroglycerin (NITROSTAT) SL tablet 0.4 mg, 0.4 mg, Sublingual, Q5 Min PRN, Dionte Gordon MD  •  ondansetron (ZOFRAN) injection 4 mg, 4 mg, Intravenous, Q6H PRN, Dionte Gordon MD  •  ondansetron ODT (ZOFRAN-ODT) disintegrating tablet 4 mg, 4 mg, Oral, Q6H PRN, Dionte Gordon MD  •  oxymetazoline (AFRIN) nasal spray 2 spray, 2 spray, Each Nare, BID PRN, Dionte Gordon MD  •  phenol (CHLORASEPTIC) 1.4 % liquid 1 spray, 1 spray, Mouth/Throat, Q4H PRN, Dionte Gordon MD  •  polyethylene glycol (MIRALAX) packet 17 g, 17 g, Oral, Daily PRN,  "Dionte Gordon MD  •  potassium chloride (MICRO-K) CR capsule 40 mEq, 40 mEq, Oral, Daily, Chetna Randolph, ADDY  •  promethazine (PHENERGAN) 12.5 mg in sodium chloride 0.9 % 50 mL, 12.5 mg, Intravenous, Q6H PRN, Dionte Gordon MD  •  promethazine (PHENERGAN) tablet 12.5 mg, 12.5 mg, Oral, Q6H PRN, Dionte Gordon MD  •  sennosides-docusate (PERICOLACE) 8.6-50 MG per tablet 1 tablet, 1 tablet, Oral, Daily PRN, Dionte Gordon MD  •  sodium chloride nasal spray 1 spray, 1 spray, Each Nare, Daily PRN, Dionte Gordon MD  •  traZODone (DESYREL) tablet 150 mg, 150 mg, Oral, Nightly PRN, Dionte Gordon MD  •  warfarin (COUMADIN) tablet 7.5 mg, 7.5 mg, Oral, Daily, Chetna Randolph, APRN    Data:     Code Status:    There are no questions and answers to display.       No family history on file.    Social History     Socioeconomic History   • Marital status: Single     Spouse name: Not on file   • Number of children: Not on file   • Years of education: Not on file   • Highest education level: Not on file   Tobacco Use   • Smoking status: Never Smoker       Vitals:  Ht 170.2 cm (67\")   Wt 102 kg (224 lb 1.6 oz)   BMI 35.10 kg/m²   T 98.1 HR 72 RR 20 /56 SpO2 99% (3 lpm)          I/O (24Hr):  No intake or output data in the 24 hours ending 03/01/21 1046    Labs and imaging:      Recent Results (from the past 12 hour(s))   Basic Metabolic Panel    Collection Time: 03/01/21  4:58 AM    Specimen: Blood   Result Value Ref Range    Glucose 109 (H) 65 - 99 mg/dL    BUN 18 8 - 23 mg/dL    Creatinine 0.69 0.57 - 1.00 mg/dL    Sodium 139 136 - 145 mmol/L    Potassium 3.5 3.5 - 5.2 mmol/L    Chloride 100 98 - 107 mmol/L    CO2 30.0 (H) 22.0 - 29.0 mmol/L    Calcium 9.1 8.6 - 10.5 mg/dL    eGFR  African Amer 102 >60 mL/min/1.73    eGFR Non African Amer 84 >60 mL/min/1.73    BUN/Creatinine Ratio 26.1 (H) 7.0 - 25.0    Anion Gap 9.0 5.0 - 15.0 mmol/L   BNP    Collection Time: " 03/01/21  4:58 AM    Specimen: Blood   Result Value Ref Range    proBNP 177.5 0.0 - 900.0 pg/mL   CBC Auto Differential    Collection Time: 03/01/21  4:58 AM    Specimen: Blood   Result Value Ref Range    WBC 4.92 3.40 - 10.80 10*3/mm3    RBC 3.85 3.77 - 5.28 10*6/mm3    Hemoglobin 10.9 (L) 12.0 - 15.9 g/dL    Hematocrit 31.5 (L) 34.0 - 46.6 %    MCV 81.8 79.0 - 97.0 fL    MCH 28.3 26.6 - 33.0 pg    MCHC 34.6 31.5 - 35.7 g/dL    RDW 14.6 12.3 - 15.4 %    RDW-SD 43.8 37.0 - 54.0 fl    MPV 10.1 6.0 - 12.0 fL    Platelets 194 140 - 450 10*3/mm3    Neutrophil % 34.2 (L) 42.7 - 76.0 %    Lymphocyte % 45.3 19.6 - 45.3 %    Monocyte % 10.4 5.0 - 12.0 %    Eosinophil % 8.9 (H) 0.3 - 6.2 %    Basophil % 0.8 0.0 - 1.5 %    Immature Grans % 0.4 0.0 - 0.5 %    Neutrophils, Absolute 1.68 (L) 1.70 - 7.00 10*3/mm3    Lymphocytes, Absolute 2.23 0.70 - 3.10 10*3/mm3    Monocytes, Absolute 0.51 0.10 - 0.90 10*3/mm3    Eosinophils, Absolute 0.44 (H) 0.00 - 0.40 10*3/mm3    Basophils, Absolute 0.04 0.00 - 0.20 10*3/mm3    Immature Grans, Absolute 0.02 0.00 - 0.05 10*3/mm3    nRBC 0.0 0.0 - 0.2 /100 WBC   Protime-INR    Collection Time: 03/01/21  4:58 AM    Specimen: Blood   Result Value Ref Range    Protime 24.8 (H) 11.9 - 14.6 Seconds    INR 2.26 (H) 0.91 - 1.09           Physical Examination:        Physical Exam  Vitals signs and nursing note reviewed.   Constitutional:       Appearance: Normal appearance. She is well-developed.   HENT:      Head: Normocephalic and atraumatic.      Right Ear: External ear normal.      Left Ear: External ear normal.      Nose: Nose normal.      Mouth/Throat:      Mouth: Mucous membranes are moist.   Eyes:      Conjunctiva/sclera: Conjunctivae normal.      Pupils: Pupils are equal, round, and reactive to light.   Neck:      Musculoskeletal: Normal range of motion and neck supple.   Cardiovascular:      Rate and Rhythm: Normal rate and regular rhythm.      Pulses: Normal pulses.      Heart sounds:  Normal heart sounds.   Pulmonary:      Effort: Pulmonary effort is normal.      Comments: Diminished breath sounds bases  Abdominal:      General: Bowel sounds are normal. There is no distension.      Palpations: Abdomen is soft.   Musculoskeletal: Normal range of motion.      Right lower leg: Edema present.      Left lower leg: Edema present.      Comments: Generalized weakness   Skin:     General: Skin is warm and dry.   Neurological:      General: No focal deficit present.      Mental Status: She is alert and oriented to person, place, and time.      Cranial Nerves: No cranial nerve deficit.   Psychiatric:         Mood and Affect: Mood normal.         Behavior: Behavior normal.         Thought Content: Thought content normal.           Assessment:            * No active hospital problems. *    Past Medical History:   Diagnosis Date   • Depression    • High cholesterol    • Hypertension    • Obesity    • Pulmonary emboli (CMS/HCC)         Plan:        1. Acute hypoxic respiratory failure  2. Bilateral pulmonary emboli  3. S/p COVID-19 pneumonia  4. HTN  5. Chronic anticoagulation  6. HLD  7. Depression  8. Insomnia  9. Hypokalemia    Continue current treatment. Monitor counts. Increase activity. Continue oxygen weaning as tolerated. Labs Thursday. Maintain patient safety. Continue anticoagulation and titrate dose as needed. Aggressive therapies as tolerated.  Discharge planning.     Electronically signed by ADDY Pate on 3/1/2021 at 10:46 CST    I have discussed the care of Leo Wong, including pertinent history and exam findings, with the nurse practitioner.    I have seen and examined the patient and the key elements of all parts of the encounter have been performed by me.  I agree with the assessment, plan and orders as documented by ADDY Nielson, after I modified the exam findings and the plan of treatments and the final version is my approved version of the  assessment.        Electronically signed by Dionte Gordon MD on 3/1/2021 at 21:07 CST

## 2021-03-02 LAB
HOLD SPECIMEN: NORMAL
INR PPP: 2.32 (ref 0.91–1.09)
PROTHROMBIN TIME: 25.4 SECONDS (ref 11.9–14.6)
WHOLE BLOOD HOLD SPECIMEN: NORMAL

## 2021-03-02 PROCEDURE — 97116 GAIT TRAINING THERAPY: CPT

## 2021-03-02 PROCEDURE — 97535 SELF CARE MNGMENT TRAINING: CPT

## 2021-03-02 PROCEDURE — 97530 THERAPEUTIC ACTIVITIES: CPT

## 2021-03-02 PROCEDURE — 85610 PROTHROMBIN TIME: CPT | Performed by: INTERNAL MEDICINE

## 2021-03-02 PROCEDURE — 97110 THERAPEUTIC EXERCISES: CPT

## 2021-03-02 NOTE — PROGRESS NOTES
Adult Nutrition  Assessment/PES    Patient Name:  Leo Wong  YOB: 1950  MRN: [id number]  Admit Date:  2/8/2021    Assessment Date:  3/2/2021    Comments:  PO intake remains good, 77% avg of 6 meals. She was working with therapy at time of RD visit. Nutrition remains appropriate. Will continue to follow.     Reason for Assessment     Row Name 03/02/21 1410          Reason for Assessment    Reason For Assessment  follow-up protocol         Nutrition/Diet History     Row Name 03/02/21 1410          Nutrition/Diet History    Typical Food/Fluid Intake  Pt continues with a good appetite. At time of RD visit, pt was working with therapy.         Anthropometrics     Row Name 03/02/21 1413          Usual Body Weight (UBW)    Weight Loss Time Frame  current wt 224# -- 18# loss compared to admission wt        Body Mass Index (BMI)    BMI Assessment  BMI 35-39.9: obesity grade II         Labs/Tests/Procedures/Meds     Row Name 03/02/21 1414          Labs/Procedures/Meds    Lab Results Reviewed  reviewed        Medications    Pertinent Medications Reviewed  reviewed         Physical Findings     Row Name 03/02/21 1414          Physical Findings    Overall Physical Appearance  obese;on oxygen therapy     Skin  edema           Nutrition Prescription Ordered     Row Name 03/02/21 1415          Nutrition Prescription PO    Current PO Diet  Regular     Fluid Consistency  Thin         Evaluation of Received Nutrient/Fluid Intake     Row Name 03/02/21 1415          Nutrient/Fluid Evaluation    Number of Days Evaluated  2 days     Additional Documentation  Fluid Intake Evaluation (Group)        Fluid Intake Evaluation    Oral Fluid (mL)  1740        PO Evaluation    Number of Meals  6     % PO Intake  77               Problem/Interventions:  Problem 1     Row Name 03/02/21 1415          Nutrition Diagnoses Problem 1    Problem 1  Nutrition Appropriate for Condition at this Time     Signs/Symptoms (evidenced by)  PO  Intake;Report/Observation     Percent (%) intake recorded  77 %     Over number of meals  6     Reported/Observed By  Patient     Appetite  Good               Intervention Goal     Row Name 03/02/21 1415          Intervention Goal    General  Maintain nutrition;Meet nutritional needs for age/condition;Reduce/improve symptoms;Disease management/therapy     PO  Maintain intake;Meet estimated needs     Weight  Appropriate weight loss         Nutrition Intervention     Row Name 03/02/21 1416          Nutrition Intervention    RD/Tech Action  Follow Tx progress;Care plan reviewd           Education/Evaluation     Row Name 03/02/21 1416          Education    Education  No discharge needs identified at this time        Monitor/Evaluation    Monitor  Per protocol           Electronically signed by:  Ladonna Perez  03/02/21 14:16 CST

## 2021-03-02 NOTE — PROGRESS NOTES
"LTACH Fall Assessment Note    Leo Wong is a 70 y.o.female  [Ht: 170.2 cm (67\"); Wt: 102 kg (224 lb 1.6 oz)] admitted 2/8/2021  5:26 PM.    Current medications associated with an increased risk for fall include:  Escitalopram  Furosemide  Hydrochlorothiazide  Lisinopril  Melatonin  Hydroxyzine  Ondansetron  Promethazine  Trazodone    DEXTER Gonzalez, [facility]  03/02/2113:35 CST         "

## 2021-03-02 NOTE — PROGRESS NOTES
DELVIN Villalobos APRN        Internal Medicine Progress Note    3/2/2021   11:34 CST    Name:  Leo Wong  MRN:    [id number]     Acct:     [id number]   Room:  [address] [place] [address] Day: 0     Admit Date: 2/8/2021  5:26 PM  PCP: Provider, No Known    Subjective:     C/C: weakness, shortness of breath    Interval History: Status: stayed the same. Up to chair.  No family at bedside. Progressing with therapy. Afebrile. 02 sats stable with 02 at 8lpm with rest. Had been tolerating lower oxygen liter flow, but states that while sleeping overnight her nasal cannula fell out of her nose and she woke with hypoxia and dizziness. Progressing with therapy.   .Review of Systems   Constitution: Positive for malaise/fatigue. Negative for chills, fever and weight loss.   HENT: Negative for congestion, hoarse voice and nosebleeds.    Eyes: Negative for blurred vision and photophobia.   Cardiovascular: Positive for dyspnea on exertion. Negative for chest pain, near-syncope and orthopnea.   Respiratory: Positive for shortness of breath. Negative for cough and sputum production.    Endocrine: Negative for cold intolerance and polyuria.   Hematologic/Lymphatic: Negative for adenopathy and bleeding problem.   Skin: Negative for dry skin, itching and rash.   Musculoskeletal: Negative for arthritis, back pain and joint pain.   Gastrointestinal: Negative for abdominal pain, diarrhea, heartburn, nausea and vomiting.   Genitourinary: Negative for dysuria and flank pain.   Neurological: Positive for weakness. Negative for dizziness, headaches, paresthesias and seizures.   Psychiatric/Behavioral: Negative for altered mental status and memory loss. The patient is not nervous/anxious.    Allergic/Immunologic: Negative for environmental allergies and hives.         Medications:     Allergies:   Allergies   Allergen Reactions   • Poison Ivy Extract Unknown - Low Severity       Current Meds:   Current  Facility-Administered Medications:   •  acetaminophen (TYLENOL) tablet 650 mg, 650 mg, Oral, Q4H PRN, Dionte Gordon MD  •  albuterol sulfate HFA (PROVENTIL HFA;VENTOLIN HFA;PROAIR HFA) inhaler 2 puff, 2 puff, Inhalation, Q4H PRN, Dionte Gordon MD  •  atorvastatin (LIPITOR) tablet 10 mg, 10 mg, Oral, Daily, Dionte Gordon MD  •  benzocaine-menthol (CHLORASEPTIC) lozenge 1 lozenge, 1 lozenge, Mouth/Throat, Q2H PRN, Dionte Gordon MD  •  bisacodyl (DULCOLAX) suppository 10 mg, 10 mg, Rectal, Daily PRN, Dionte Gordon MD  •  escitalopram (LEXAPRO) tablet 20 mg, 20 mg, Oral, Daily, Dionte Gordon MD  •  fluticasone (FLONASE) 50 MCG/ACT nasal spray 1 spray, 1 spray, Each Nare, BID PRN, Dionte Gordon MD  •  furosemide (LASIX) tablet 40 mg, 40 mg, Oral, Daily, Dionte Gordon MD  •  guaiFENesin (ROBITUSSIN) 100 MG/5ML oral solution 200 mg, 200 mg, Oral, Q4H PRN, Dionte Gordon MD  •  hydroCHLOROthiazide (HYDRODIURIL) tablet 25 mg, 25 mg, Oral, Daily, Dionte Gordon MD  •  hydrOXYzine (ATARAX) tablet 50 mg, 50 mg, Oral, TID PRN, Dionte Gordon MD  •  lisinopril (PRINIVIL,ZESTRIL) tablet 10 mg, 10 mg, Oral, Q24H, Dionte Gordon MD  •  melatonin tablet 6 mg, 6 mg, Oral, Nightly, Dionte Gordon MD  •  nitroglycerin (NITROSTAT) SL tablet 0.4 mg, 0.4 mg, Sublingual, Q5 Min PRN, Dionte Gordon MD  •  ondansetron (ZOFRAN) injection 4 mg, 4 mg, Intravenous, Q6H PRN, Dionte Gordon MD  •  ondansetron ODT (ZOFRAN-ODT) disintegrating tablet 4 mg, 4 mg, Oral, Q6H PRN, Dionte Gordon MD  •  oxymetazoline (AFRIN) nasal spray 2 spray, 2 spray, Each Nare, BID PRN, Dionte Gordon MD  •  phenol (CHLORASEPTIC) 1.4 % liquid 1 spray, 1 spray, Mouth/Throat, Q4H PRN, Dionte Gordon MD  •  polyethylene glycol (MIRALAX) packet 17 g, 17 g, Oral, Daily PRN, Dionte Gordon MD  •  potassium chloride  "(MICRO-K) CR capsule 40 mEq, 40 mEq, Oral, Daily, Chetna Randolph, APRN  •  promethazine (PHENERGAN) 12.5 mg in sodium chloride 0.9 % 50 mL, 12.5 mg, Intravenous, Q6H PRN, Dionte Gordon MD  •  promethazine (PHENERGAN) tablet 12.5 mg, 12.5 mg, Oral, Q6H PRN, Dionte Gordon MD  •  sennosides-docusate (PERICOLACE) 8.6-50 MG per tablet 1 tablet, 1 tablet, Oral, Daily PRN, Dionte Gordon MD  •  sodium chloride nasal spray 1 spray, 1 spray, Each Nare, Daily PRN, Dionte Gordon MD  •  traZODone (DESYREL) tablet 150 mg, 150 mg, Oral, Nightly PRN, Dionte Gordon MD  •  warfarin (COUMADIN) tablet 7.5 mg, 7.5 mg, Oral, Daily, Chetna Randolph, APRANA    Data:     Code Status:    There are no questions and answers to display.       No family history on file.    Social History     Socioeconomic History   • Marital status: Single     Spouse name: Not on file   • Number of children: Not on file   • Years of education: Not on file   • Highest education level: Not on file   Tobacco Use   • Smoking status: Never Smoker       Vitals:  Ht 170.2 cm (67\")   Wt 102 kg (224 lb 1.6 oz)   BMI 35.10 kg/m²   T 98.4 HR 73 RR 20 BP 95/52 SpO2 99% (8 lpm)          I/O (24Hr):  No intake or output data in the 24 hours ending 03/02/21 1134    Labs and imaging:      Recent Results (from the past 12 hour(s))   Protime-INR    Collection Time: 03/02/21  3:30 AM    Specimen: Blood   Result Value Ref Range    Protime 25.4 (H) 11.9 - 14.6 Seconds    INR 2.32 (H) 0.91 - 1.09   Lavender Top    Collection Time: 03/02/21  5:00 AM   Result Value Ref Range    Extra Tube hold for add-on    Green Top (Gel)    Collection Time: 03/02/21  5:00 AM   Result Value Ref Range    Extra Tube Hold for add-ons.            Physical Examination:        Physical Exam  Vitals signs and nursing note reviewed.   Constitutional:       Appearance: Normal appearance. She is well-developed.   HENT:      Head: Normocephalic and " atraumatic.      Right Ear: External ear normal.      Left Ear: External ear normal.      Nose: Nose normal.      Mouth/Throat:      Mouth: Mucous membranes are moist.   Eyes:      Conjunctiva/sclera: Conjunctivae normal.      Pupils: Pupils are equal, round, and reactive to light.   Neck:      Musculoskeletal: Normal range of motion and neck supple.   Cardiovascular:      Rate and Rhythm: Normal rate and regular rhythm.      Pulses: Normal pulses.      Heart sounds: Normal heart sounds.   Pulmonary:      Effort: Pulmonary effort is normal.      Comments: Diminished breath sounds bases  Abdominal:      General: Bowel sounds are normal. There is no distension.      Palpations: Abdomen is soft.   Musculoskeletal: Normal range of motion.      Right lower leg: Edema present.      Left lower leg: Edema present.      Comments: Generalized weakness   Skin:     General: Skin is warm and dry.   Neurological:      General: No focal deficit present.      Mental Status: She is alert and oriented to person, place, and time.      Cranial Nerves: No cranial nerve deficit.   Psychiatric:         Mood and Affect: Mood normal.         Behavior: Behavior normal.         Thought Content: Thought content normal.           Assessment:            * No active hospital problems. *    Past Medical History:   Diagnosis Date   • Depression    • High cholesterol    • Hypertension    • Obesity    • Pulmonary emboli (CMS/HCC)         Plan:        1. Acute hypoxic respiratory failure  2. Bilateral pulmonary emboli  3. S/p COVID-19 pneumonia  4. HTN  5. Chronic anticoagulation  6. HLD  7. Depression  8. Insomnia  9. Hypokalemia    Continue current treatment. Monitor counts. Increase activity. Continue oxygen weaning as tolerated. Labs Thursday. Maintain patient safety. Continue anticoagulation and titrate dose as needed. Aggressive therapies as tolerated.  Discharge planning.     Electronically signed by ADDY Pate on 3/2/2021 at  11:34 CST  I have discussed the care of Leo Wong, including pertinent history and exam findings, with the nurse practitioner.    I have seen and examined the patient and the key elements of all parts of the encounter have been performed by me.  I agree with the assessment, plan and orders as documented by ADDY Nielson, after I modified the exam findings and the plan of treatments and the final version is my approved version of the assessment.        Electronically signed by Dionte Gordon MD on 3/2/2021 at 17:53 CST

## 2021-03-03 LAB
INR PPP: 2.27 (ref 0.91–1.09)
PROTHROMBIN TIME: 24.9 SECONDS (ref 11.9–14.6)

## 2021-03-03 PROCEDURE — 97110 THERAPEUTIC EXERCISES: CPT

## 2021-03-03 PROCEDURE — 85610 PROTHROMBIN TIME: CPT | Performed by: INTERNAL MEDICINE

## 2021-03-03 PROCEDURE — 97116 GAIT TRAINING THERAPY: CPT

## 2021-03-03 NOTE — PROGRESS NOTES
DELVIN Villalobos APRN        Internal Medicine Progress Note    3/3/2021   09:59 CST    Name:  Leo Wong  MRN:    [id number]     Acct:     [id number]   Room:  [address] [place] [address] Day: 0     Admit Date: 2/8/2021  5:26 PM  PCP: Provider, No Known    Subjective:     C/C: weakness, shortness of breath    Interval History: Status: stayed the same. Up to chair. No family at bedside. Progressing with therapy. Afebrile. 02 sats stable with 02 at 3lpm with rest. Progressing with therapy. Anxious for discharge plan. Hopeful for transfer to [facility].   .Review of Systems   Constitution: Positive for malaise/fatigue. Negative for chills, fever and weight loss.   HENT: Negative for congestion, hoarse voice and nosebleeds.    Eyes: Negative for blurred vision and photophobia.   Cardiovascular: Positive for dyspnea on exertion. Negative for chest pain, near-syncope and orthopnea.   Respiratory: Positive for shortness of breath. Negative for cough and sputum production.    Endocrine: Negative for cold intolerance and polyuria.   Hematologic/Lymphatic: Negative for adenopathy and bleeding problem.   Skin: Negative for dry skin, itching and rash.   Musculoskeletal: Negative for arthritis, back pain and joint pain.   Gastrointestinal: Negative for abdominal pain, diarrhea, heartburn, nausea and vomiting.   Genitourinary: Negative for dysuria and flank pain.   Neurological: Positive for weakness. Negative for dizziness, headaches, paresthesias and seizures.   Psychiatric/Behavioral: Negative for altered mental status and memory loss. The patient is not nervous/anxious.    Allergic/Immunologic: Negative for environmental allergies and hives.         Medications:     Allergies:   Allergies   Allergen Reactions   • Poison Ivy Extract Unknown - Low Severity       Current Meds:   Current Facility-Administered Medications:   •  acetaminophen (TYLENOL) tablet 650 mg, 650 mg, Oral, Q4H JOSE CARLOSN, Dionte Gordon  MD Mandeep  •  albuterol sulfate HFA (PROVENTIL HFA;VENTOLIN HFA;PROAIR HFA) inhaler 2 puff, 2 puff, Inhalation, Q4H PRN, Dionte Gordon MD  •  atorvastatin (LIPITOR) tablet 10 mg, 10 mg, Oral, Daily, Dionte Gordon MD  •  benzocaine-menthol (CHLORASEPTIC) lozenge 1 lozenge, 1 lozenge, Mouth/Throat, Q2H PRN, Dionte Gordon MD  •  bisacodyl (DULCOLAX) suppository 10 mg, 10 mg, Rectal, Daily PRN, Dionte Gordon MD  •  escitalopram (LEXAPRO) tablet 20 mg, 20 mg, Oral, Daily, Dionte Gordon MD  •  fluticasone (FLONASE) 50 MCG/ACT nasal spray 1 spray, 1 spray, Each Nare, BID PRN, Dionte Gordon MD  •  furosemide (LASIX) tablet 40 mg, 40 mg, Oral, Daily, Dionte Gordon MD  •  guaiFENesin (ROBITUSSIN) 100 MG/5ML oral solution 200 mg, 200 mg, Oral, Q4H PRN, Dionte Gordon MD  •  hydroCHLOROthiazide (HYDRODIURIL) tablet 25 mg, 25 mg, Oral, Daily, Dionte Gordon MD  •  hydrOXYzine (ATARAX) tablet 50 mg, 50 mg, Oral, TID PRN, Dionte Gordon MD  •  lisinopril (PRINIVIL,ZESTRIL) tablet 10 mg, 10 mg, Oral, Q24H, Dionte Gordon MD  •  melatonin tablet 6 mg, 6 mg, Oral, Nightly, Dionte Gordon MD  •  nitroglycerin (NITROSTAT) SL tablet 0.4 mg, 0.4 mg, Sublingual, Q5 Min PRN, Dionte Gordon MD  •  ondansetron (ZOFRAN) injection 4 mg, 4 mg, Intravenous, Q6H PRN, Dionte Gordon MD  •  ondansetron ODT (ZOFRAN-ODT) disintegrating tablet 4 mg, 4 mg, Oral, Q6H PRN, Dionte Gordon MD  •  oxymetazoline (AFRIN) nasal spray 2 spray, 2 spray, Each Nare, BID PRN, Dionte Gordon MD  •  phenol (CHLORASEPTIC) 1.4 % liquid 1 spray, 1 spray, Mouth/Throat, Q4H PRN, Dionte Gordon MD  •  polyethylene glycol (MIRALAX) packet 17 g, 17 g, Oral, Daily PRN, Dionte Gordon MD  •  potassium chloride (MICRO-K) CR capsule 40 mEq, 40 mEq, Oral, Daily, Chetna Randolph APRN  •  promethazine (PHENERGAN) 12.5 mg in  "sodium chloride 0.9 % 50 mL, 12.5 mg, Intravenous, Q6H PRN, Dionte Gordon MD  •  promethazine (PHENERGAN) tablet 12.5 mg, 12.5 mg, Oral, Q6H PRN, Dionte Gordon MD  •  sennosides-docusate (PERICOLACE) 8.6-50 MG per tablet 1 tablet, 1 tablet, Oral, Daily PRN, Dionte Gordon MD  •  sodium chloride nasal spray 1 spray, 1 spray, Each Nare, Daily PRN, Dionte Gordon MD  •  traZODone (DESYREL) tablet 150 mg, 150 mg, Oral, Nightly PRN, Dionte Gordon MD  •  warfarin (COUMADIN) tablet 7.5 mg, 7.5 mg, Oral, Daily, Chetna Randolph, ADDY    Data:     Code Status:    There are no questions and answers to display.       No family history on file.    Social History     Socioeconomic History   • Marital status: Single     Spouse name: Not on file   • Number of children: Not on file   • Years of education: Not on file   • Highest education level: Not on file   Tobacco Use   • Smoking status: Never Smoker       Vitals:  Ht 170.2 cm (67\")   Wt 102 kg (224 lb 1.6 oz)   BMI 35.10 kg/m²   T 98.5 HR 73 RR 22 BP 99/56 SpO2 100% (3 lpm)          I/O (24Hr):  No intake or output data in the 24 hours ending 03/03/21 0959    Labs and imaging:      Recent Results (from the past 12 hour(s))   Protime-INR    Collection Time: 03/03/21  4:47 AM    Specimen: Blood   Result Value Ref Range    Protime 24.9 (H) 11.9 - 14.6 Seconds    INR 2.27 (H) 0.91 - 1.09           Physical Examination:        Physical Exam  Vitals signs and nursing note reviewed.   Constitutional:       Appearance: Normal appearance. She is well-developed.   HENT:      Head: Normocephalic and atraumatic.      Right Ear: External ear normal.      Left Ear: External ear normal.      Nose: Nose normal.      Mouth/Throat:      Mouth: Mucous membranes are moist.   Eyes:      Conjunctiva/sclera: Conjunctivae normal.      Pupils: Pupils are equal, round, and reactive to light.   Neck:      Musculoskeletal: Normal range of motion and neck " supple.   Cardiovascular:      Rate and Rhythm: Normal rate and regular rhythm.      Pulses: Normal pulses.      Heart sounds: Normal heart sounds.   Pulmonary:      Effort: Pulmonary effort is normal.      Comments: Diminished breath sounds bases  Abdominal:      General: Bowel sounds are normal. There is no distension.      Palpations: Abdomen is soft.   Musculoskeletal: Normal range of motion.      Right lower leg: Edema present.      Left lower leg: Edema present.      Comments: Generalized weakness   Skin:     General: Skin is warm and dry.   Neurological:      General: No focal deficit present.      Mental Status: She is alert and oriented to person, place, and time.      Cranial Nerves: No cranial nerve deficit.   Psychiatric:         Mood and Affect: Mood normal.         Behavior: Behavior normal.         Thought Content: Thought content normal.           Assessment:            * No active hospital problems. *    Past Medical History:   Diagnosis Date   • Depression    • High cholesterol    • Hypertension    • Obesity    • Pulmonary emboli (CMS/HCC)         Plan:        1. Acute hypoxic respiratory failure  2. Bilateral pulmonary emboli  3. S/p COVID-19 pneumonia  4. HTN  5. Chronic anticoagulation  6. HLD  7. Depression  8. Insomnia  9. Hypokalemia    Continue current treatment. Monitor counts. Increase activity. Continue oxygen weaning as tolerated. Labs in am. Maintain patient safety. Continue anticoagulation and titrate dose as needed. Aggressive therapies as tolerated.  Discharge planning.     Electronically signed by ADDY Pate on 3/3/2021 at 09:59 CST

## 2021-03-04 LAB
ANION GAP SERPL CALCULATED.3IONS-SCNC: 10 MMOL/L (ref 5–15)
BASOPHILS # BLD AUTO: 0.03 10*3/MM3 (ref 0–0.2)
BASOPHILS NFR BLD AUTO: 0.6 % (ref 0–1.5)
BUN SERPL-MCNC: 22 MG/DL (ref 8–23)
BUN/CREAT SERPL: 23.9 (ref 7–25)
CALCIUM SPEC-SCNC: 8.9 MG/DL (ref 8.6–10.5)
CHLORIDE SERPL-SCNC: 103 MMOL/L (ref 98–107)
CO2 SERPL-SCNC: 29 MMOL/L (ref 22–29)
CREAT SERPL-MCNC: 0.92 MG/DL (ref 0.57–1)
DEPRECATED RDW RBC AUTO: 46.9 FL (ref 37–54)
EOSINOPHIL # BLD AUTO: 0.41 10*3/MM3 (ref 0–0.4)
EOSINOPHIL NFR BLD AUTO: 8.1 % (ref 0.3–6.2)
ERYTHROCYTE [DISTWIDTH] IN BLOOD BY AUTOMATED COUNT: 14.9 % (ref 12.3–15.4)
GFR SERPL CREATININE-BSD FRML MDRD: 60 ML/MIN/1.73
GFR SERPL CREATININE-BSD FRML MDRD: 73 ML/MIN/1.73
GLUCOSE SERPL-MCNC: 116 MG/DL (ref 65–99)
HCT VFR BLD AUTO: 31.3 % (ref 34–46.6)
HGB BLD-MCNC: 10.4 G/DL (ref 12–15.9)
IMM GRANULOCYTES # BLD AUTO: 0.02 10*3/MM3 (ref 0–0.05)
IMM GRANULOCYTES NFR BLD AUTO: 0.4 % (ref 0–0.5)
INR PPP: 2.48 (ref 0.91–1.09)
LYMPHOCYTES # BLD AUTO: 1.87 10*3/MM3 (ref 0.7–3.1)
LYMPHOCYTES NFR BLD AUTO: 37.1 % (ref 19.6–45.3)
MCH RBC QN AUTO: 28.5 PG (ref 26.6–33)
MCHC RBC AUTO-ENTMCNC: 33.2 G/DL (ref 31.5–35.7)
MCV RBC AUTO: 85.8 FL (ref 79–97)
MONOCYTES # BLD AUTO: 0.5 10*3/MM3 (ref 0.1–0.9)
MONOCYTES NFR BLD AUTO: 9.9 % (ref 5–12)
NEUTROPHILS NFR BLD AUTO: 2.21 10*3/MM3 (ref 1.7–7)
NEUTROPHILS NFR BLD AUTO: 43.9 % (ref 42.7–76)
NRBC BLD AUTO-RTO: 0 /100 WBC (ref 0–0.2)
NT-PROBNP SERPL-MCNC: 118.3 PG/ML (ref 0–900)
PLATELET # BLD AUTO: 191 10*3/MM3 (ref 140–450)
PMV BLD AUTO: 9.9 FL (ref 6–12)
POTASSIUM SERPL-SCNC: 3.8 MMOL/L (ref 3.5–5.2)
PROTHROMBIN TIME: 26.8 SECONDS (ref 11.9–14.6)
RBC # BLD AUTO: 3.65 10*6/MM3 (ref 3.77–5.28)
SODIUM SERPL-SCNC: 142 MMOL/L (ref 136–145)
WBC # BLD AUTO: 5.04 10*3/MM3 (ref 3.4–10.8)

## 2021-03-04 PROCEDURE — 97116 GAIT TRAINING THERAPY: CPT

## 2021-03-04 PROCEDURE — 85025 COMPLETE CBC W/AUTO DIFF WBC: CPT | Performed by: INTERNAL MEDICINE

## 2021-03-04 PROCEDURE — 80048 BASIC METABOLIC PNL TOTAL CA: CPT | Performed by: INTERNAL MEDICINE

## 2021-03-04 PROCEDURE — 83880 ASSAY OF NATRIURETIC PEPTIDE: CPT | Performed by: INTERNAL MEDICINE

## 2021-03-04 PROCEDURE — 85610 PROTHROMBIN TIME: CPT | Performed by: INTERNAL MEDICINE

## 2021-03-04 PROCEDURE — 97110 THERAPEUTIC EXERCISES: CPT

## 2021-03-04 NOTE — PROGRESS NOTES
Evan Gordon M.D.  ADDY Nielson        Internal Medicine Progress Note    3/4/2021   09:44 CST    Name:  Leo Wong  MRN:    [id number]     Acct:     [id number]   Room:  [address] [place] [address] Day: 0     Admit Date: 2/8/2021  5:26 PM  PCP: Provider, No Known    Subjective:     C/C: weakness, shortness of breath    Interval History: Status: stayed the same. Up to chair. No family at bedside. Progressing with therapy. Afebrile. Counts stable. 02 sats stable with 02 at 3lpm with rest. Progressing with therapy. Reported some increased shortness of breath when trying to ambulate with 02 at 4 lpm today. Anxious for discharge plan. Hopeful for transfer to [facility].   .Review of Systems   Constitution: Positive for malaise/fatigue. Negative for chills, fever and weight loss.   HENT: Negative for congestion, hoarse voice and nosebleeds.    Eyes: Negative for blurred vision and photophobia.   Cardiovascular: Positive for dyspnea on exertion. Negative for chest pain, near-syncope and orthopnea.   Respiratory: Positive for shortness of breath. Negative for cough and sputum production.    Endocrine: Negative for cold intolerance and polyuria.   Hematologic/Lymphatic: Negative for adenopathy and bleeding problem.   Skin: Negative for dry skin, itching and rash.   Musculoskeletal: Negative for arthritis, back pain and joint pain.   Gastrointestinal: Negative for abdominal pain, diarrhea, heartburn, nausea and vomiting.   Genitourinary: Negative for dysuria and flank pain.   Neurological: Positive for weakness. Negative for dizziness, headaches, paresthesias and seizures.   Psychiatric/Behavioral: Negative for altered mental status and memory loss. The patient is not nervous/anxious.    Allergic/Immunologic: Negative for environmental allergies and hives.         Medications:     Allergies:   Allergies   Allergen Reactions   • Poison Ivy Extract Unknown - Low Severity       Current Meds:   Current  Facility-Administered Medications:   •  acetaminophen (TYLENOL) tablet 650 mg, 650 mg, Oral, Q4H PRN, Dionte Gordon MD  •  albuterol sulfate HFA (PROVENTIL HFA;VENTOLIN HFA;PROAIR HFA) inhaler 2 puff, 2 puff, Inhalation, Q4H PRN, Dionte Gordon MD  •  atorvastatin (LIPITOR) tablet 10 mg, 10 mg, Oral, Daily, Dionte Gordon MD  •  benzocaine-menthol (CHLORASEPTIC) lozenge 1 lozenge, 1 lozenge, Mouth/Throat, Q2H PRN, Dionte Gordon MD  •  bisacodyl (DULCOLAX) suppository 10 mg, 10 mg, Rectal, Daily PRN, Dionte Gordon MD  •  escitalopram (LEXAPRO) tablet 20 mg, 20 mg, Oral, Daily, Dionte Gordon MD  •  fluticasone (FLONASE) 50 MCG/ACT nasal spray 1 spray, 1 spray, Each Nare, BID PRN, Dionte Gordon MD  •  furosemide (LASIX) tablet 40 mg, 40 mg, Oral, Daily, Dionte Gordon MD  •  guaiFENesin (ROBITUSSIN) 100 MG/5ML oral solution 200 mg, 200 mg, Oral, Q4H PRN, Dionte Gordon MD  •  hydroCHLOROthiazide (HYDRODIURIL) tablet 25 mg, 25 mg, Oral, Daily, Dionte Gordon MD  •  hydrOXYzine (ATARAX) tablet 50 mg, 50 mg, Oral, TID PRN, Dionte Gordon MD  •  lisinopril (PRINIVIL,ZESTRIL) tablet 10 mg, 10 mg, Oral, Q24H, Dionte Gordon MD  •  melatonin tablet 6 mg, 6 mg, Oral, Nightly, Dionte Gordon MD  •  nitroglycerin (NITROSTAT) SL tablet 0.4 mg, 0.4 mg, Sublingual, Q5 Min PRN, Dionte Gordon MD  •  ondansetron (ZOFRAN) injection 4 mg, 4 mg, Intravenous, Q6H PRN, Dionte Gordon MD  •  ondansetron ODT (ZOFRAN-ODT) disintegrating tablet 4 mg, 4 mg, Oral, Q6H PRN, Dionte Gordon MD  •  oxymetazoline (AFRIN) nasal spray 2 spray, 2 spray, Each Nare, BID PRN, Dionte Gordon MD  •  phenol (CHLORASEPTIC) 1.4 % liquid 1 spray, 1 spray, Mouth/Throat, Q4H PRN, Dionte Gordon MD  •  polyethylene glycol (MIRALAX) packet 17 g, 17 g, Oral, Daily PRN, Dionte Gordon MD  •  potassium chloride  "(MICRO-K) CR capsule 40 mEq, 40 mEq, Oral, Daily, Chetna Randolph, APRN  •  promethazine (PHENERGAN) 12.5 mg in sodium chloride 0.9 % 50 mL, 12.5 mg, Intravenous, Q6H PRN, Dionte Gordon MD  •  promethazine (PHENERGAN) tablet 12.5 mg, 12.5 mg, Oral, Q6H PRN, Dionte Gordon MD  •  sennosides-docusate (PERICOLACE) 8.6-50 MG per tablet 1 tablet, 1 tablet, Oral, Daily PRN, Dionte Gordon MD  •  sodium chloride nasal spray 1 spray, 1 spray, Each Nare, Daily PRN, Dionte Gordon MD  •  traZODone (DESYREL) tablet 150 mg, 150 mg, Oral, Nightly PRN, Dionte Gordon MD  •  warfarin (COUMADIN) tablet 7.5 mg, 7.5 mg, Oral, Daily, Chetna Randolph, ADDY    Data:     Code Status:    There are no questions and answers to display.       No family history on file.    Social History     Socioeconomic History   • Marital status: Single     Spouse name: Not on file   • Number of children: Not on file   • Years of education: Not on file   • Highest education level: Not on file   Tobacco Use   • Smoking status: Never Smoker       Vitals:  Ht 170.2 cm (67\")   Wt 102 kg (224 lb 1.6 oz)   BMI 35.10 kg/m²   T 98 HR 73 RR 20 /61 SpO2 97% (3 lpm)          I/O (24Hr):  No intake or output data in the 24 hours ending 03/04/21 0944    Labs and imaging:      Recent Results (from the past 12 hour(s))   Protime-INR    Collection Time: 03/04/21  5:04 AM    Specimen: Blood   Result Value Ref Range    Protime 26.8 (H) 11.9 - 14.6 Seconds    INR 2.48 (H) 0.91 - 1.09   Basic Metabolic Panel    Collection Time: 03/04/21  5:04 AM    Specimen: Blood   Result Value Ref Range    Glucose 116 (H) 65 - 99 mg/dL    BUN 22 8 - 23 mg/dL    Creatinine 0.92 0.57 - 1.00 mg/dL    Sodium 142 136 - 145 mmol/L    Potassium 3.8 3.5 - 5.2 mmol/L    Chloride 103 98 - 107 mmol/L    CO2 29.0 22.0 - 29.0 mmol/L    Calcium 8.9 8.6 - 10.5 mg/dL    eGFR  African Amer 73 >60 mL/min/1.73    eGFR Non African Amer 60 (L) >60 " mL/min/1.73    BUN/Creatinine Ratio 23.9 7.0 - 25.0    Anion Gap 10.0 5.0 - 15.0 mmol/L   BNP    Collection Time: 03/04/21  5:04 AM    Specimen: Blood   Result Value Ref Range    proBNP 118.3 0.0 - 900.0 pg/mL   CBC Auto Differential    Collection Time: 03/04/21  5:04 AM    Specimen: Blood   Result Value Ref Range    WBC 5.04 3.40 - 10.80 10*3/mm3    RBC 3.65 (L) 3.77 - 5.28 10*6/mm3    Hemoglobin 10.4 (L) 12.0 - 15.9 g/dL    Hematocrit 31.3 (L) 34.0 - 46.6 %    MCV 85.8 79.0 - 97.0 fL    MCH 28.5 26.6 - 33.0 pg    MCHC 33.2 31.5 - 35.7 g/dL    RDW 14.9 12.3 - 15.4 %    RDW-SD 46.9 37.0 - 54.0 fl    MPV 9.9 6.0 - 12.0 fL    Platelets 191 140 - 450 10*3/mm3    Neutrophil % 43.9 42.7 - 76.0 %    Lymphocyte % 37.1 19.6 - 45.3 %    Monocyte % 9.9 5.0 - 12.0 %    Eosinophil % 8.1 (H) 0.3 - 6.2 %    Basophil % 0.6 0.0 - 1.5 %    Immature Grans % 0.4 0.0 - 0.5 %    Neutrophils, Absolute 2.21 1.70 - 7.00 10*3/mm3    Lymphocytes, Absolute 1.87 0.70 - 3.10 10*3/mm3    Monocytes, Absolute 0.50 0.10 - 0.90 10*3/mm3    Eosinophils, Absolute 0.41 (H) 0.00 - 0.40 10*3/mm3    Basophils, Absolute 0.03 0.00 - 0.20 10*3/mm3    Immature Grans, Absolute 0.02 0.00 - 0.05 10*3/mm3    nRBC 0.0 0.0 - 0.2 /100 WBC           Physical Examination:        Physical Exam  Vitals signs and nursing note reviewed.   Constitutional:       Appearance: Normal appearance. She is well-developed.   HENT:      Head: Normocephalic and atraumatic.      Right Ear: External ear normal.      Left Ear: External ear normal.      Nose: Nose normal.      Mouth/Throat:      Mouth: Mucous membranes are moist.   Eyes:      Conjunctiva/sclera: Conjunctivae normal.      Pupils: Pupils are equal, round, and reactive to light.   Neck:      Musculoskeletal: Normal range of motion and neck supple.   Cardiovascular:      Rate and Rhythm: Normal rate and regular rhythm.      Pulses: Normal pulses.      Heart sounds: Normal heart sounds.   Pulmonary:      Effort: Pulmonary  effort is normal.      Comments: Diminished breath sounds bases  Abdominal:      General: Bowel sounds are normal. There is no distension.      Palpations: Abdomen is soft.   Musculoskeletal: Normal range of motion.      Right lower leg: Edema present.      Left lower leg: Edema present.      Comments: Generalized weakness   Skin:     General: Skin is warm and dry.   Neurological:      General: No focal deficit present.      Mental Status: She is alert and oriented to person, place, and time.      Cranial Nerves: No cranial nerve deficit.   Psychiatric:         Mood and Affect: Mood normal.         Behavior: Behavior normal.         Thought Content: Thought content normal.           Assessment:            * No active hospital problems. *    Past Medical History:   Diagnosis Date   • Depression    • High cholesterol    • Hypertension    • Obesity    • Pulmonary emboli (CMS/HCC)         Plan:        1. Acute hypoxic respiratory failure  2. Bilateral pulmonary emboli  3. S/p COVID-19 pneumonia  4. HTN  5. Chronic anticoagulation  6. HLD  7. Depression  8. Insomnia  9. Hypokalemia    Continue current treatment. Monitor counts. Increase activity. Continue oxygen weaning as tolerated. Labs Monday. Maintain patient safety. Continue anticoagulation and titrate dose as needed. Aggressive therapies as tolerated.  Discharge planning.     Electronically signed by ADDY Pate on 3/4/2021 at 09:44 CST  I have discussed the care of Leo Wong, including pertinent history and exam findings, with the nurse practitioner.    I have seen and examined the patient and the key elements of all parts of the encounter have been performed by me.  I agree with the assessment, plan and orders as documented by ADDY Nielson, after I modified the exam findings and the plan of treatments and the final version is my approved version of the assessment.        Electronically signed by Dionte Gordon MD on 3/4/2021 at  22:47 CST

## 2021-03-05 LAB
INR PPP: 2.55 (ref 0.91–1.09)
PROTHROMBIN TIME: 27.4 SECONDS (ref 11.9–14.6)

## 2021-03-05 PROCEDURE — 85610 PROTHROMBIN TIME: CPT | Performed by: INTERNAL MEDICINE

## 2021-03-05 PROCEDURE — 97116 GAIT TRAINING THERAPY: CPT

## 2021-03-05 PROCEDURE — 97535 SELF CARE MNGMENT TRAINING: CPT

## 2021-03-05 NOTE — PROGRESS NOTES
DELVIN Villalobos APRN        Internal Medicine Progress Note    3/5/2021   09:43 CST    Name:  Leo Wong  MRN:    [id number]     Acct:     [id number]   Room:  [address] [place] [address] Day: 0     Admit Date: 2/8/2021  5:26 PM  PCP: Provider, No Known    Subjective:     C/C: weakness, shortness of breath    Interval History: Status: stayed the same. Up to chair. No family at bedside. Progressing with therapy. Afebrile. Counts stable. 02 sats stable with 02 at 3lpm with rest. Progressing with therapy.  Anxious for discharge plan. Hopeful for transfer to [facility].   .Review of Systems   Constitution: Positive for malaise/fatigue. Negative for chills, fever and weight loss.   HENT: Negative for congestion, hoarse voice and nosebleeds.    Eyes: Negative for blurred vision and photophobia.   Cardiovascular: Positive for dyspnea on exertion. Negative for chest pain, near-syncope and orthopnea.   Respiratory: Positive for shortness of breath. Negative for cough and sputum production.    Endocrine: Negative for cold intolerance and polyuria.   Hematologic/Lymphatic: Negative for adenopathy and bleeding problem.   Skin: Negative for dry skin, itching and rash.   Musculoskeletal: Negative for arthritis, back pain and joint pain.   Gastrointestinal: Negative for abdominal pain, diarrhea, heartburn, nausea and vomiting.   Genitourinary: Negative for dysuria and flank pain.   Neurological: Positive for weakness. Negative for dizziness, headaches, paresthesias and seizures.   Psychiatric/Behavioral: Negative for altered mental status and memory loss. The patient is not nervous/anxious.    Allergic/Immunologic: Negative for environmental allergies and hives.         Medications:     Allergies:   Allergies   Allergen Reactions   • Poison Ivy Extract Unknown - Low Severity       Current Meds:   Current Facility-Administered Medications:   •  acetaminophen (TYLENOL) tablet 650 mg, 650 mg, Oral, Q4H PRN,  Dionte Gordon MD  •  albuterol sulfate HFA (PROVENTIL HFA;VENTOLIN HFA;PROAIR HFA) inhaler 2 puff, 2 puff, Inhalation, Q4H PRN, Dionte Gordon MD  •  atorvastatin (LIPITOR) tablet 10 mg, 10 mg, Oral, Daily, Dionte Gordon MD  •  benzocaine-menthol (CHLORASEPTIC) lozenge 1 lozenge, 1 lozenge, Mouth/Throat, Q2H PRN, Dionte Gordon MD  •  bisacodyl (DULCOLAX) suppository 10 mg, 10 mg, Rectal, Daily PRN, Dionte Gordon MD  •  escitalopram (LEXAPRO) tablet 20 mg, 20 mg, Oral, Daily, Dionte Gordon MD  •  fluticasone (FLONASE) 50 MCG/ACT nasal spray 1 spray, 1 spray, Each Nare, BID PRN, Dionte Gordon MD  •  furosemide (LASIX) tablet 40 mg, 40 mg, Oral, Daily, Dionte Gordon MD  •  guaiFENesin (ROBITUSSIN) 100 MG/5ML oral solution 200 mg, 200 mg, Oral, Q4H PRN, Dionte Gordon MD  •  hydroCHLOROthiazide (HYDRODIURIL) tablet 25 mg, 25 mg, Oral, Daily, Dionte Gordon MD  •  hydrOXYzine (ATARAX) tablet 50 mg, 50 mg, Oral, TID PRN, Dionte Gordon MD  •  lisinopril (PRINIVIL,ZESTRIL) tablet 10 mg, 10 mg, Oral, Q24H, Dionte Gordon MD  •  melatonin tablet 6 mg, 6 mg, Oral, Nightly, Dionte Gorodn MD  •  nitroglycerin (NITROSTAT) SL tablet 0.4 mg, 0.4 mg, Sublingual, Q5 Min PRN, Dionte Gordon MD  •  ondansetron (ZOFRAN) injection 4 mg, 4 mg, Intravenous, Q6H PRN, Dionte Gordon MD  •  ondansetron ODT (ZOFRAN-ODT) disintegrating tablet 4 mg, 4 mg, Oral, Q6H PRN, Dionte Gordon MD  •  oxymetazoline (AFRIN) nasal spray 2 spray, 2 spray, Each Nare, BID PRN, Dionte Gordon MD  •  phenol (CHLORASEPTIC) 1.4 % liquid 1 spray, 1 spray, Mouth/Throat, Q4H PRN, Dionte Gordon MD  •  polyethylene glycol (MIRALAX) packet 17 g, 17 g, Oral, Daily PRN, Dionte Gordon MD  •  potassium chloride (MICRO-K) CR capsule 40 mEq, 40 mEq, Oral, Daily, Chetna Randolph APRN  •  promethazine  "(PHENERGAN) 12.5 mg in sodium chloride 0.9 % 50 mL, 12.5 mg, Intravenous, Q6H PRN, Dionte Gordon MD  •  promethazine (PHENERGAN) tablet 12.5 mg, 12.5 mg, Oral, Q6H PRN, Dionte Gordon MD  •  sennosides-docusate (PERICOLACE) 8.6-50 MG per tablet 1 tablet, 1 tablet, Oral, Daily PRN, Dionte Gordon MD  •  sodium chloride nasal spray 1 spray, 1 spray, Each Nare, Daily PRN, Dionte Gordon MD  •  traZODone (DESYREL) tablet 150 mg, 150 mg, Oral, Nightly PRN, Dionte Gordon MD  •  warfarin (COUMADIN) tablet 7.5 mg, 7.5 mg, Oral, Daily, Chetna Randolph, ADDY    Data:     Code Status:    There are no questions and answers to display.       No family history on file.    Social History     Socioeconomic History   • Marital status: Single     Spouse name: Not on file   • Number of children: Not on file   • Years of education: Not on file   • Highest education level: Not on file   Tobacco Use   • Smoking status: Never Smoker       Vitals:  Ht 170.2 cm (67\")   Wt 102 kg (224 lb 1.6 oz)   BMI 35.10 kg/m²   T 98.6 HR 69 RR 22 /70 SpO2 98% (3 lpm)          I/O (24Hr):  No intake or output data in the 24 hours ending 03/05/21 0943    Labs and imaging:      Recent Results (from the past 12 hour(s))   Protime-INR    Collection Time: 03/05/21  4:26 AM    Specimen: Blood   Result Value Ref Range    Protime 27.4 (H) 11.9 - 14.6 Seconds    INR 2.55 (H) 0.91 - 1.09           Physical Examination:        Physical Exam  Vitals signs and nursing note reviewed.   Constitutional:       Appearance: Normal appearance. She is well-developed.   HENT:      Head: Normocephalic and atraumatic.      Right Ear: External ear normal.      Left Ear: External ear normal.      Nose: Nose normal.      Mouth/Throat:      Mouth: Mucous membranes are moist.   Eyes:      Conjunctiva/sclera: Conjunctivae normal.      Pupils: Pupils are equal, round, and reactive to light.   Neck:      Musculoskeletal: Normal " range of motion and neck supple.   Cardiovascular:      Rate and Rhythm: Normal rate and regular rhythm.      Pulses: Normal pulses.      Heart sounds: Normal heart sounds.   Pulmonary:      Effort: Pulmonary effort is normal.      Comments: Diminished breath sounds bases  Abdominal:      General: Bowel sounds are normal. There is no distension.      Palpations: Abdomen is soft.   Musculoskeletal: Normal range of motion.      Right lower leg: Edema present.      Left lower leg: Edema present.      Comments: Generalized weakness   Skin:     General: Skin is warm and dry.   Neurological:      General: No focal deficit present.      Mental Status: She is alert and oriented to person, place, and time.      Cranial Nerves: No cranial nerve deficit.   Psychiatric:         Mood and Affect: Mood normal.         Behavior: Behavior normal.         Thought Content: Thought content normal.           Assessment:            * No active hospital problems. *    Past Medical History:   Diagnosis Date   • Depression    • High cholesterol    • Hypertension    • Obesity    • Pulmonary emboli (CMS/HCC)         Plan:        1. Acute hypoxic respiratory failure  2. Bilateral pulmonary emboli  3. S/p COVID-19 pneumonia  4. HTN  5. Chronic anticoagulation  6. HLD  7. Depression  8. Insomnia  9. Hypokalemia    Continue current treatment. Monitor counts. Increase activity. Continue oxygen weaning as tolerated. Labs Monday. Maintain patient safety. Continue anticoagulation and titrate dose as needed. Aggressive therapies as tolerated.  Discharge planning.     Electronically signed by ADDY Pate on 3/5/2021 at 09:43 CST

## 2021-03-06 LAB
HOLD SPECIMEN: NORMAL
INR PPP: 2.46 (ref 0.91–1.09)
PROTHROMBIN TIME: 26.6 SECONDS (ref 11.9–14.6)
WHOLE BLOOD HOLD SPECIMEN: NORMAL

## 2021-03-06 PROCEDURE — 85610 PROTHROMBIN TIME: CPT | Performed by: INTERNAL MEDICINE

## 2021-03-07 PROCEDURE — 97116 GAIT TRAINING THERAPY: CPT

## 2021-03-07 NOTE — PROGRESS NOTES
Evan Gordon M.D.  ADDY Nielson        Internal Medicine Progress Note    3/7/2021   14:40 CST    Name:  Leo Wong  MRN:    [id number]     Acct:     [id number]   Room:  [address] [place] [address] Day: 0     Admit Date: 2/8/2021  5:26 PM  PCP: Provider, No Known    Subjective:     C/C: weakness, shortness of breath    Interval History: Status: stayed the same.  Sleeping, arouses easily.  No family at bedside.  Afebrile.  Progressing with therapy. Anxious for discharge plan. Hopeful for transfer to [facility].         .Review of Systems   Constitutional: Positive for malaise/fatigue. Negative for chills, fever and weight loss.   HENT: Negative for congestion, hoarse voice and nosebleeds.    Eyes: Negative for blurred vision and photophobia.   Cardiovascular: Positive for dyspnea on exertion. Negative for chest pain, near-syncope and orthopnea.   Respiratory: Positive for shortness of breath. Negative for cough and sputum production.    Endocrine: Negative for cold intolerance and polyuria.   Hematologic/Lymphatic: Negative for adenopathy and bleeding problem.   Skin: Negative for dry skin, itching and rash.   Musculoskeletal: Negative for arthritis, back pain and joint pain.   Gastrointestinal: Negative for abdominal pain, diarrhea, heartburn, nausea and vomiting.   Genitourinary: Negative for dysuria and flank pain.   Neurological: Positive for weakness. Negative for dizziness, headaches, paresthesias and seizures.   Psychiatric/Behavioral: Negative for altered mental status and memory loss. The patient is not nervous/anxious.    Allergic/Immunologic: Negative for environmental allergies and hives.         Medications:     Allergies:   Allergies   Allergen Reactions    Poison Ivy Extract Unknown - Low Severity       Current Meds:   Current Facility-Administered Medications:     acetaminophen (TYLENOL) tablet 650 mg, 650 mg, Oral, Q4H PRN, Dionte Gordon MD    albuterol sulfate HFA (PROVENTIL  HFA;VENTOLIN HFA;PROAIR HFA) inhaler 2 puff, 2 puff, Inhalation, Q4H PRN, Dionte Gordon MD    atorvastatin (LIPITOR) tablet 10 mg, 10 mg, Oral, Daily, Dionte Gordon MD    benzocaine-menthol (CHLORASEPTIC) lozenge 1 lozenge, 1 lozenge, Mouth/Throat, Q2H PRN, Dionte Gordon MD    bisacodyl (DULCOLAX) suppository 10 mg, 10 mg, Rectal, Daily PRN, Dionte Gordon MD    escitalopram (LEXAPRO) tablet 20 mg, 20 mg, Oral, Daily, Dionte Gordon MD    fluticasone (FLONASE) 50 MCG/ACT nasal spray 1 spray, 1 spray, Each Nare, BID PRN, Dionte Gordon MD    furosemide (LASIX) tablet 40 mg, 40 mg, Oral, Daily, Dionte Gordon MD    guaiFENesin (ROBITUSSIN) 100 MG/5ML oral solution 200 mg, 200 mg, Oral, Q4H PRN, Dionte Gordon MD    hydroCHLOROthiazide (HYDRODIURIL) tablet 25 mg, 25 mg, Oral, Daily, Dionte Gordon MD    hydrOXYzine (ATARAX) tablet 50 mg, 50 mg, Oral, TID PRN, Dionte Gordon MD    lisinopril (PRINIVIL,ZESTRIL) tablet 10 mg, 10 mg, Oral, Q24H, Dionte Gordon MD    melatonin tablet 6 mg, 6 mg, Oral, Nightly, Dionte Gordon MD    nitroglycerin (NITROSTAT) SL tablet 0.4 mg, 0.4 mg, Sublingual, Q5 Min PRN, Dionte Gordon MD    ondansetron (ZOFRAN) injection 4 mg, 4 mg, Intravenous, Q6H PRN, Dionte Gordon MD    ondansetron ODT (ZOFRAN-ODT) disintegrating tablet 4 mg, 4 mg, Oral, Q6H PRN, Dionte Gordon MD    oxymetazoline (AFRIN) nasal spray 2 spray, 2 spray, Each Nare, BID PRN, Dionte Gordon MD    phenol (CHLORASEPTIC) 1.4 % liquid 1 spray, 1 spray, Mouth/Throat, Q4H PRN, Dionte Gordon MD    polyethylene glycol (MIRALAX) packet 17 g, 17 g, Oral, Daily PRN, Dionte Gordon MD    potassium chloride (MICRO-K) CR capsule 40 mEq, 40 mEq, Oral, Daily, Chetna Randolph APRN    promethazine (PHENERGAN) 12.5 mg in sodium chloride 0.9 % 50 mL, 12.5 mg, Intravenous, Q6H PRN, Evan  "Dionte Kaufman MD    promethazine (PHENERGAN) tablet 12.5 mg, 12.5 mg, Oral, Q6H PRN, Dionte Gordon MD    sennosides-docusate (PERICOLACE) 8.6-50 MG per tablet 1 tablet, 1 tablet, Oral, Daily PRN, Dionte Gordon MD    sodium chloride nasal spray 1 spray, 1 spray, Each Nare, Daily PRN, Dionte Gordon MD    traZODone (DESYREL) tablet 150 mg, 150 mg, Oral, Nightly PRN, Dionte Gordon MD    warfarin (COUMADIN) tablet 7.5 mg, 7.5 mg, Oral, Daily, Chetna Randolph, ADDY    Data:     Code Status:    There are no questions and answers to display.       No family history on file.    Social History     Socioeconomic History    Marital status: Single     Spouse name: Not on file    Number of children: Not on file    Years of education: Not on file    Highest education level: Not on file   Tobacco Use    Smoking status: Never Smoker       Vitals:  Ht 170.2 cm (67\")   Wt 102 kg (224 lb 1.6 oz)   BMI 35.10 kg/m²   T 96.8 HR 80 RR 18 /70 SpO2 96% (3 lpm)      I/O (24Hr):  No intake or output data in the 24 hours ending 03/07/21 1440    Labs and imaging:      No results found for this or any previous visit (from the past 12 hour(s)).        Physical Examination:        Physical Exam  Vitals and nursing note reviewed.   Constitutional:       Appearance: Normal appearance. She is well-developed.   HENT:      Head: Normocephalic and atraumatic.      Right Ear: External ear normal.      Left Ear: External ear normal.      Nose: Nose normal.      Mouth/Throat:      Mouth: Mucous membranes are moist.   Eyes:      Conjunctiva/sclera: Conjunctivae normal.      Pupils: Pupils are equal, round, and reactive to light.   Cardiovascular:      Rate and Rhythm: Normal rate and regular rhythm.      Pulses: Normal pulses.      Heart sounds: Normal heart sounds.   Pulmonary:      Effort: Pulmonary effort is normal.      Comments: Diminished breath sounds bases  Abdominal:      General: Bowel sounds are " normal. There is no distension.      Palpations: Abdomen is soft.   Musculoskeletal:         General: Normal range of motion.      Cervical back: Normal range of motion and neck supple.      Right lower leg: Edema present.      Left lower leg: Edema present.      Comments: Generalized weakness   Skin:     General: Skin is warm and dry.   Neurological:      General: No focal deficit present.      Mental Status: She is alert and oriented to person, place, and time.      Cranial Nerves: No cranial nerve deficit.   Psychiatric:         Mood and Affect: Mood normal.         Behavior: Behavior normal.         Thought Content: Thought content normal.           Assessment:            * No active hospital problems. *    Past Medical History:   Diagnosis Date    Depression     High cholesterol     Hypertension     Obesity     Pulmonary emboli (CMS/HCC)         Plan:        Acute hypoxic respiratory failure  Bilateral pulmonary emboli  S/p COVID-19 pneumonia  HTN  Chronic anticoagulation  HLD  Depression  Insomnia  Hypokalemia    Continue current treatment. Monitor counts. Increase activity. Continue oxygen weaning as tolerated. Labs Monday. Maintain patient safety. Continue anticoagulation and titrate dose as needed. Aggressive therapies as tolerated.  Discharge planning.     Electronically signed by ADDY Colindres on 3/7/2021 at 14:40 CST  I have discussed the care of Leo Wong, including pertinent history and exam findings, with the nurse practitioner.    I have seen and examined the patient and the key elements of all parts of the encounter have been performed by me.  I agree with the assessment, plan and orders as documented by ADDY Coleman, after I modified the exam findings and the plan of treatments and the final version is my approved version of the assessment.        Electronically signed by Dionte Gordon MD on 3/7/2021 at 20:32 CST

## 2021-03-08 LAB
ANION GAP SERPL CALCULATED.3IONS-SCNC: 10 MMOL/L (ref 5–15)
BASOPHILS # BLD AUTO: 0.04 10*3/MM3 (ref 0–0.2)
BASOPHILS NFR BLD AUTO: 0.8 % (ref 0–1.5)
BUN SERPL-MCNC: 21 MG/DL (ref 8–23)
BUN/CREAT SERPL: 23.9 (ref 7–25)
CALCIUM SPEC-SCNC: 9.7 MG/DL (ref 8.6–10.5)
CHLORIDE SERPL-SCNC: 100 MMOL/L (ref 98–107)
CO2 SERPL-SCNC: 30 MMOL/L (ref 22–29)
CREAT SERPL-MCNC: 0.88 MG/DL (ref 0.57–1)
CRP SERPL-MCNC: <0.3 MG/DL (ref 0–0.5)
DEPRECATED RDW RBC AUTO: 46.4 FL (ref 37–54)
EOSINOPHIL # BLD AUTO: 0.48 10*3/MM3 (ref 0–0.4)
EOSINOPHIL NFR BLD AUTO: 9.8 % (ref 0.3–6.2)
ERYTHROCYTE [DISTWIDTH] IN BLOOD BY AUTOMATED COUNT: 14.8 % (ref 12.3–15.4)
GFR SERPL CREATININE-BSD FRML MDRD: 63 ML/MIN/1.73
GFR SERPL CREATININE-BSD FRML MDRD: 77 ML/MIN/1.73
GLUCOSE SERPL-MCNC: 120 MG/DL (ref 65–99)
HCT VFR BLD AUTO: 34.2 % (ref 34–46.6)
HGB BLD-MCNC: 11.5 G/DL (ref 12–15.9)
IMM GRANULOCYTES # BLD AUTO: 0.01 10*3/MM3 (ref 0–0.05)
IMM GRANULOCYTES NFR BLD AUTO: 0.2 % (ref 0–0.5)
INR PPP: 2.48 (ref 0.91–1.09)
LYMPHOCYTES # BLD AUTO: 1.94 10*3/MM3 (ref 0.7–3.1)
LYMPHOCYTES NFR BLD AUTO: 39.8 % (ref 19.6–45.3)
MCH RBC QN AUTO: 28.8 PG (ref 26.6–33)
MCHC RBC AUTO-ENTMCNC: 33.6 G/DL (ref 31.5–35.7)
MCV RBC AUTO: 85.5 FL (ref 79–97)
MONOCYTES # BLD AUTO: 0.45 10*3/MM3 (ref 0.1–0.9)
MONOCYTES NFR BLD AUTO: 9.2 % (ref 5–12)
NEUTROPHILS NFR BLD AUTO: 1.96 10*3/MM3 (ref 1.7–7)
NEUTROPHILS NFR BLD AUTO: 40.2 % (ref 42.7–76)
NRBC BLD AUTO-RTO: 0 /100 WBC (ref 0–0.2)
NT-PROBNP SERPL-MCNC: 165.1 PG/ML (ref 0–900)
PLATELET # BLD AUTO: 225 10*3/MM3 (ref 140–450)
PMV BLD AUTO: 10.8 FL (ref 6–12)
POTASSIUM SERPL-SCNC: 3.6 MMOL/L (ref 3.5–5.2)
PROTHROMBIN TIME: 26.8 SECONDS (ref 11.9–14.6)
RBC # BLD AUTO: 4 10*6/MM3 (ref 3.77–5.28)
SODIUM SERPL-SCNC: 140 MMOL/L (ref 136–145)
WBC # BLD AUTO: 4.88 10*3/MM3 (ref 3.4–10.8)

## 2021-03-08 PROCEDURE — 97110 THERAPEUTIC EXERCISES: CPT

## 2021-03-08 PROCEDURE — 80048 BASIC METABOLIC PNL TOTAL CA: CPT | Performed by: INTERNAL MEDICINE

## 2021-03-08 PROCEDURE — 86140 C-REACTIVE PROTEIN: CPT | Performed by: INTERNAL MEDICINE

## 2021-03-08 PROCEDURE — 83880 ASSAY OF NATRIURETIC PEPTIDE: CPT | Performed by: INTERNAL MEDICINE

## 2021-03-08 PROCEDURE — 85610 PROTHROMBIN TIME: CPT | Performed by: INTERNAL MEDICINE

## 2021-03-08 PROCEDURE — 97116 GAIT TRAINING THERAPY: CPT | Performed by: PHYSICAL THERAPIST

## 2021-03-08 PROCEDURE — 85025 COMPLETE CBC W/AUTO DIFF WBC: CPT | Performed by: INTERNAL MEDICINE

## 2021-03-08 RX ORDER — FUROSEMIDE 20 MG/1
20 TABLET ORAL DAILY
Status: DISCONTINUED | OUTPATIENT
Start: 2021-03-09 | End: 2021-03-11

## 2021-03-08 NOTE — PROGRESS NOTES
Evan Gordon M.D.  ADDY Nielson        Internal Medicine Progress Note    3/8/2021   09:45 CST    Name:  Leo Wong  MRN:    [id number]     Acct:     [id number]   Room:  [address] [place] [address] Day: 0     Admit Date: 2/8/2021  5:26 PM  PCP: Provider, No Known    Subjective:     C/C: weakness, shortness of breath    Interval History: Status: stayed the same. Up to chair. No family at bedside. 02 sats stable with 02 at 3 lpm at rest. Requiring 02 at 4 lpm with activity and tolerating without difficulty. Denies pain. Asking for lasix dose to be decreased. Afebrile. Anxious for discharge plan.         .Review of Systems   Constitutional: Positive for malaise/fatigue. Negative for chills, fever and weight loss.   HENT: Negative for congestion, hoarse voice and nosebleeds.    Eyes: Negative for blurred vision and photophobia.   Cardiovascular: Positive for dyspnea on exertion. Negative for chest pain, near-syncope and orthopnea.   Respiratory: Positive for shortness of breath. Negative for cough and sputum production.    Endocrine: Negative for cold intolerance and polyuria.   Hematologic/Lymphatic: Negative for adenopathy and bleeding problem.   Skin: Negative for dry skin, itching and rash.   Musculoskeletal: Negative for arthritis, back pain and joint pain.   Gastrointestinal: Negative for abdominal pain, diarrhea, heartburn, nausea and vomiting.   Genitourinary: Negative for dysuria and flank pain.   Neurological: Positive for weakness. Negative for dizziness, headaches, paresthesias and seizures.   Psychiatric/Behavioral: Negative for altered mental status and memory loss. The patient is not nervous/anxious.    Allergic/Immunologic: Negative for environmental allergies and hives.         Medications:     Allergies:   Allergies   Allergen Reactions   • Poison Ivy Extract Unknown - Low Severity       Current Meds:   Current Facility-Administered Medications:   •  acetaminophen (TYLENOL) tablet 650 mg,  650 mg, Oral, Q4H PRN, Dionte Gordon MD  •  albuterol sulfate HFA (PROVENTIL HFA;VENTOLIN HFA;PROAIR HFA) inhaler 2 puff, 2 puff, Inhalation, Q4H PRN, Dionte Gordon MD  •  atorvastatin (LIPITOR) tablet 10 mg, 10 mg, Oral, Daily, Dionte Gordon MD  •  benzocaine-menthol (CHLORASEPTIC) lozenge 1 lozenge, 1 lozenge, Mouth/Throat, Q2H PRN, Dinote Gordon MD  •  bisacodyl (DULCOLAX) suppository 10 mg, 10 mg, Rectal, Daily PRN, Dionte Gordon MD  •  escitalopram (LEXAPRO) tablet 20 mg, 20 mg, Oral, Daily, Dionte Gordon MD  •  fluticasone (FLONASE) 50 MCG/ACT nasal spray 1 spray, 1 spray, Each Nare, BID PRN, Dionte Gordon MD  •  furosemide (LASIX) tablet 40 mg, 40 mg, Oral, Daily, Dionte Gordon MD  •  guaiFENesin (ROBITUSSIN) 100 MG/5ML oral solution 200 mg, 200 mg, Oral, Q4H PRN, Dionte Gordon MD  •  hydroCHLOROthiazide (HYDRODIURIL) tablet 25 mg, 25 mg, Oral, Daily, Dionte Gordon MD  •  hydrOXYzine (ATARAX) tablet 50 mg, 50 mg, Oral, TID PRN, Dionte Gordon MD  •  lisinopril (PRINIVIL,ZESTRIL) tablet 10 mg, 10 mg, Oral, Q24H, Dionet Gordon MD  •  melatonin tablet 6 mg, 6 mg, Oral, Nightly, Dionte Gordon MD  •  nitroglycerin (NITROSTAT) SL tablet 0.4 mg, 0.4 mg, Sublingual, Q5 Min PRN, Dionte Gordon MD  •  ondansetron (ZOFRAN) injection 4 mg, 4 mg, Intravenous, Q6H PRN, Dionte Gordon MD  •  ondansetron ODT (ZOFRAN-ODT) disintegrating tablet 4 mg, 4 mg, Oral, Q6H PRN, Dionte Gordon MD  •  oxymetazoline (AFRIN) nasal spray 2 spray, 2 spray, Each Nare, BID PRN, Dionte Gordon MD  •  phenol (CHLORASEPTIC) 1.4 % liquid 1 spray, 1 spray, Mouth/Throat, Q4H PRN, Dionte Gordon MD  •  polyethylene glycol (MIRALAX) packet 17 g, 17 g, Oral, Daily PRN, Dionte Gordon MD  •  potassium chloride (MICRO-K) CR capsule 40 mEq, 40 mEq, Oral, Daily, Chetna Randolph, ADDY  •   "promethazine (PHENERGAN) 12.5 mg in sodium chloride 0.9 % 50 mL, 12.5 mg, Intravenous, Q6H PRN, Dionte Gordon MD  •  promethazine (PHENERGAN) tablet 12.5 mg, 12.5 mg, Oral, Q6H PRN, Dionte Gordon MD  •  sennosides-docusate (PERICOLACE) 8.6-50 MG per tablet 1 tablet, 1 tablet, Oral, Daily PRN, Dionte Gordon MD  •  sodium chloride nasal spray 1 spray, 1 spray, Each Nare, Daily PRN, Dionte Gordon MD  •  traZODone (DESYREL) tablet 150 mg, 150 mg, Oral, Nightly PRN, Dionte Gordon MD  •  warfarin (COUMADIN) tablet 7.5 mg, 7.5 mg, Oral, Daily, Chetna Randolph, ADDY    Data:     Code Status:    There are no questions and answers to display.       No family history on file.    Social History     Socioeconomic History   • Marital status: Single     Spouse name: Not on file   • Number of children: Not on file   • Years of education: Not on file   • Highest education level: Not on file   Tobacco Use   • Smoking status: Never Smoker       Vitals:  Ht 170.2 cm (67\")   Wt 103 kg (226 lb 14.4 oz)   BMI 35.54 kg/m²   T 97.3 HR 65 RR 14 /52 SpO2 98% (3 lpm)      I/O (24Hr):  No intake or output data in the 24 hours ending 03/08/21 0945    Labs and imaging:      Recent Results (from the past 12 hour(s))   Protime-INR    Collection Time: 03/08/21  4:55 AM    Specimen: Blood   Result Value Ref Range    Protime 26.8 (H) 11.9 - 14.6 Seconds    INR 2.48 (H) 0.91 - 1.09   Basic Metabolic Panel    Collection Time: 03/08/21  4:55 AM    Specimen: Blood   Result Value Ref Range    Glucose 120 (H) 65 - 99 mg/dL    BUN 21 8 - 23 mg/dL    Creatinine 0.88 0.57 - 1.00 mg/dL    Sodium 140 136 - 145 mmol/L    Potassium 3.6 3.5 - 5.2 mmol/L    Chloride 100 98 - 107 mmol/L    CO2 30.0 (H) 22.0 - 29.0 mmol/L    Calcium 9.7 8.6 - 10.5 mg/dL    eGFR  African Amer 77 >60 mL/min/1.73    eGFR Non African Amer 63 >60 mL/min/1.73    BUN/Creatinine Ratio 23.9 7.0 - 25.0    Anion Gap 10.0 5.0 - 15.0 mmol/L "   BNP    Collection Time: 03/08/21  4:55 AM    Specimen: Blood   Result Value Ref Range    proBNP 165.1 0.0 - 900.0 pg/mL   C-reactive Protein    Collection Time: 03/08/21  4:55 AM    Specimen: Blood   Result Value Ref Range    C-Reactive Protein <0.30 0.00 - 0.50 mg/dL   CBC Auto Differential    Collection Time: 03/08/21  4:55 AM    Specimen: Blood   Result Value Ref Range    WBC 4.88 3.40 - 10.80 10*3/mm3    RBC 4.00 3.77 - 5.28 10*6/mm3    Hemoglobin 11.5 (L) 12.0 - 15.9 g/dL    Hematocrit 34.2 34.0 - 46.6 %    MCV 85.5 79.0 - 97.0 fL    MCH 28.8 26.6 - 33.0 pg    MCHC 33.6 31.5 - 35.7 g/dL    RDW 14.8 12.3 - 15.4 %    RDW-SD 46.4 37.0 - 54.0 fl    MPV 10.8 6.0 - 12.0 fL    Platelets 225 140 - 450 10*3/mm3    Neutrophil % 40.2 (L) 42.7 - 76.0 %    Lymphocyte % 39.8 19.6 - 45.3 %    Monocyte % 9.2 5.0 - 12.0 %    Eosinophil % 9.8 (H) 0.3 - 6.2 %    Basophil % 0.8 0.0 - 1.5 %    Immature Grans % 0.2 0.0 - 0.5 %    Neutrophils, Absolute 1.96 1.70 - 7.00 10*3/mm3    Lymphocytes, Absolute 1.94 0.70 - 3.10 10*3/mm3    Monocytes, Absolute 0.45 0.10 - 0.90 10*3/mm3    Eosinophils, Absolute 0.48 (H) 0.00 - 0.40 10*3/mm3    Basophils, Absolute 0.04 0.00 - 0.20 10*3/mm3    Immature Grans, Absolute 0.01 0.00 - 0.05 10*3/mm3    nRBC 0.0 0.0 - 0.2 /100 WBC           Physical Examination:        Physical Exam  Vitals and nursing note reviewed.   Constitutional:       Appearance: Normal appearance. She is well-developed.   HENT:      Head: Normocephalic and atraumatic.      Right Ear: External ear normal.      Left Ear: External ear normal.      Nose: Nose normal.      Mouth/Throat:      Mouth: Mucous membranes are moist.   Eyes:      Conjunctiva/sclera: Conjunctivae normal.      Pupils: Pupils are equal, round, and reactive to light.   Cardiovascular:      Rate and Rhythm: Normal rate and regular rhythm.      Pulses: Normal pulses.      Heart sounds: Normal heart sounds.   Pulmonary:      Effort: Pulmonary effort is normal.       Comments: Diminished breath sounds bases  Abdominal:      General: Bowel sounds are normal. There is no distension.      Palpations: Abdomen is soft.   Musculoskeletal:         General: Normal range of motion.      Cervical back: Normal range of motion and neck supple.      Right lower leg: Edema present.      Left lower leg: Edema present.      Comments: Generalized weakness   Skin:     General: Skin is warm and dry.   Neurological:      General: No focal deficit present.      Mental Status: She is alert and oriented to person, place, and time.      Cranial Nerves: No cranial nerve deficit.   Psychiatric:         Mood and Affect: Mood normal.         Behavior: Behavior normal.         Thought Content: Thought content normal.           Assessment:            * No active hospital problems. *    Past Medical History:   Diagnosis Date   • Depression    • High cholesterol    • Hypertension    • Obesity    • Pulmonary emboli (CMS/HCC)         Plan:        1. Acute hypoxic respiratory failure  2. Bilateral pulmonary emboli  3. S/p COVID-19 pneumonia  4. HTN  5. Chronic anticoagulation  6. HLD  7. Depression  8. Insomnia  9. Hypokalemia    Continue current treatment. Monitor counts. Increase activity. Continue oxygen weaning as tolerated. Labs Thursday. Maintain patient safety. Continue anticoagulation and titrate dose as needed. Aggressive therapies as tolerated. Decrease lasix.  Discharge planning.     Electronically signed by ADDY Pate on 3/8/2021 at 09:45 CST  I have discussed the care of Leo Wong, including pertinent history and exam findings, with the nurse practitioner.    I have seen and examined the patient and the key elements of all parts of the encounter have been performed by me.  I agree with the assessment, plan and orders as documented by ADDY Nielson, after I modified the exam findings and the plan of treatments and the final version is my approved version of the  assessment.        Electronically signed by Dionte Gordon MD on 3/8/2021 at 20:41 CST

## 2021-03-08 NOTE — PROGRESS NOTES
"[facility] Fall Assessment Note    Leo Wong is a 70 y.o.female  [Ht: 170.2 cm (67\"); Wt: 103 kg (226 lb 14.4 oz)] admitted 2/8/2021  5:26 PM.    Current medications associated with an increased risk for fall include:  Escitalopram  Furosemide  Hydrochlorothiazide  Lisinopril  Melatonin  Hydroxyzine  Nitroglycerin  Ondansetron  Promethazine  Trazodone    DEXTER Gonzalez [facility]  03/08/2115:39 CST         "

## 2021-03-09 PROCEDURE — 97535 SELF CARE MNGMENT TRAINING: CPT | Performed by: OCCUPATIONAL THERAPIST

## 2021-03-09 PROCEDURE — 97168 OT RE-EVAL EST PLAN CARE: CPT | Performed by: OCCUPATIONAL THERAPIST

## 2021-03-09 PROCEDURE — 97116 GAIT TRAINING THERAPY: CPT

## 2021-03-09 PROCEDURE — 97530 THERAPEUTIC ACTIVITIES: CPT | Performed by: OCCUPATIONAL THERAPIST

## 2021-03-09 NOTE — PROGRESS NOTES
Adult Nutrition  Assessment/PES    Patient Name:  Leo Wong  YOB: 1950  MRN: [id number]  Admit Date:  2/8/2021    Assessment Date:  3/9/2021    Comments:  Pt's PO intake remains appropriate, 100% x5 meals. Her wt had been trending down however appears stable at this time. Continues on diuretics. Will continue to follow.     Reason for Assessment     Row Name 03/09/21 1116          Reason for Assessment    Reason For Assessment  follow-up protocol         Nutrition/Diet History     Row Name 03/09/21 1116          Nutrition/Diet History    Typical Food/Fluid Intake  Nutrition remains appropriate at this time. Pt eats 100% of her meals.         Anthropometrics     Row Name 03/09/21 1117          Usual Body Weight (UBW)    Weight Loss Time Frame  current wt 226#        Body Mass Index (BMI)    BMI Assessment  BMI 35-39.9: obesity grade II         Labs/Tests/Procedures/Meds     Row Name 03/09/21 1117          Labs/Procedures/Meds    Lab Results Reviewed  reviewed        Medications    Pertinent Medications Reviewed  reviewed         Physical Findings     Row Name 03/09/21 1117          Physical Findings    Overall Physical Appearance  obese;on oxygen therapy     Skin  edema           Nutrition Prescription Ordered     Row Name 03/09/21 1118          Nutrition Prescription PO    Current PO Diet  Regular     Fluid Consistency  Thin         Evaluation of Received Nutrient/Fluid Intake     Row Name 03/09/21 1118          Nutrient/Fluid Evaluation    Number of Days Evaluated  2 days     Additional Documentation  Fluid Intake Evaluation (Group)        Fluid Intake Evaluation    Oral Fluid (mL)  1580        PO Evaluation    Number of Meals  5     % PO Intake  100               Problem/Interventions:  Problem 1     Row Name 03/09/21 1119          Nutrition Diagnoses Problem 1    Problem 1  Nutrition Appropriate for Condition at this Time     Signs/Symptoms (evidenced by)  PO Intake;Report/Observation      Percent (%) intake recorded  100 %     Over number of meals  5               Intervention Goal     Row Name 03/09/21 1119          Intervention Goal    General  Maintain nutrition;Meet nutritional needs for age/condition;Reduce/improve symptoms;Disease management/therapy     PO  Maintain intake;Meet estimated needs     Weight  Appropriate weight loss         Nutrition Intervention     Row Name 03/09/21 1119          Nutrition Intervention    RD/Tech Action  Follow Tx progress;Care plan reviewd           Education/Evaluation     Row Name 03/09/21 1119          Education    Education  No discharge needs identified at this time        Monitor/Evaluation    Monitor  Per protocol           Electronically signed by:  Ladonna Perez  03/09/21 11:22 CST

## 2021-03-09 NOTE — PROGRESS NOTES
DELVIN Villalobos APRN        Internal Medicine Progress Note    3/9/2021   14:26 CST    Name:  Leo Wong  MRN:    [id number]     Acct:     [id number]   Room:  [address] [place] [address] Day: 0     Admit Date: 2/8/2021  5:26 PM  PCP: Provider, No Known    Subjective:     C/C: weakness, shortness of breath    Interval History: Status: stayed the same. Up to chair. No family at bedside. 02 sats stable with 02 at 3 lpm at rest. Requiring 02 at 4 lpm with activity and tolerating without difficulty. Denies pain.  Afebrile. Anxious for discharge plan.         .Review of Systems   Constitutional: Positive for malaise/fatigue. Negative for chills, fever and weight loss.   HENT: Negative for congestion, hoarse voice and nosebleeds.    Eyes: Negative for blurred vision and photophobia.   Cardiovascular: Positive for dyspnea on exertion. Negative for chest pain, near-syncope and orthopnea.   Respiratory: Positive for shortness of breath. Negative for cough and sputum production.    Endocrine: Negative for cold intolerance and polyuria.   Hematologic/Lymphatic: Negative for adenopathy and bleeding problem.   Skin: Negative for dry skin, itching and rash.   Musculoskeletal: Negative for arthritis, back pain and joint pain.   Gastrointestinal: Negative for abdominal pain, diarrhea, heartburn, nausea and vomiting.   Genitourinary: Negative for dysuria and flank pain.   Neurological: Positive for weakness. Negative for dizziness, headaches, paresthesias and seizures.   Psychiatric/Behavioral: Negative for altered mental status and memory loss. The patient is not nervous/anxious.    Allergic/Immunologic: Negative for environmental allergies and hives.         Medications:     Allergies:   Allergies   Allergen Reactions   • Poison Ivy Extract Unknown - Low Severity       Current Meds:   Current Facility-Administered Medications:   •  acetaminophen (TYLENOL) tablet 650 mg, 650 mg, Oral, Q4H PRN, Evan  Dionte Kaufman MD  •  albuterol sulfate HFA (PROVENTIL HFA;VENTOLIN HFA;PROAIR HFA) inhaler 2 puff, 2 puff, Inhalation, Q4H PRN, Dionte Gordon MD  •  atorvastatin (LIPITOR) tablet 10 mg, 10 mg, Oral, Daily, Dionte Gordon MD  •  benzocaine-menthol (CHLORASEPTIC) lozenge 1 lozenge, 1 lozenge, Mouth/Throat, Q2H PRN, Dionte Gordon MD  •  bisacodyl (DULCOLAX) suppository 10 mg, 10 mg, Rectal, Daily PRN, Dionte Gordon MD  •  escitalopram (LEXAPRO) tablet 20 mg, 20 mg, Oral, Daily, Dionte Gordon MD  •  fluticasone (FLONASE) 50 MCG/ACT nasal spray 1 spray, 1 spray, Each Nare, BID PRN, Dionte Gordon MD  •  furosemide (LASIX) tablet 20 mg, 20 mg, Oral, Daily, Chetna Randolph APRN  •  guaiFENesin (ROBITUSSIN) 100 MG/5ML oral solution 200 mg, 200 mg, Oral, Q4H PRN, Dionte Gordon MD  •  hydroCHLOROthiazide (HYDRODIURIL) tablet 25 mg, 25 mg, Oral, Daily, Dionte Gordon MD  •  hydrOXYzine (ATARAX) tablet 50 mg, 50 mg, Oral, TID PRN, Dionte Gordon MD  •  lisinopril (PRINIVIL,ZESTRIL) tablet 10 mg, 10 mg, Oral, Q24H, Dionte Gordon MD  •  melatonin tablet 6 mg, 6 mg, Oral, Nightly, Dionte Gordon MD  •  nitroglycerin (NITROSTAT) SL tablet 0.4 mg, 0.4 mg, Sublingual, Q5 Min PRN, Dionte Gordon MD  •  ondansetron (ZOFRAN) injection 4 mg, 4 mg, Intravenous, Q6H PRN, Dionte Gordon MD  •  ondansetron ODT (ZOFRAN-ODT) disintegrating tablet 4 mg, 4 mg, Oral, Q6H PRN, Dionte Gordon MD  •  oxymetazoline (AFRIN) nasal spray 2 spray, 2 spray, Each Nare, BID PRN, Dionte Gordon MD  •  phenol (CHLORASEPTIC) 1.4 % liquid 1 spray, 1 spray, Mouth/Throat, Q4H PRN, Dionte Gordon MD  •  polyethylene glycol (MIRALAX) packet 17 g, 17 g, Oral, Daily PRN, Dionte Gordon MD  •  potassium chloride (MICRO-K) CR capsule 40 mEq, 40 mEq, Oral, Daily, Chetna Randolph APRN  •  promethazine (PHENERGAN) 12.5  "mg in sodium chloride 0.9 % 50 mL, 12.5 mg, Intravenous, Q6H PRN, Dionte Gordon MD  •  promethazine (PHENERGAN) tablet 12.5 mg, 12.5 mg, Oral, Q6H PRN, Dionte Gordon MD  •  sennosides-docusate (PERICOLACE) 8.6-50 MG per tablet 1 tablet, 1 tablet, Oral, Daily PRN, Dionte Gordon MD  •  sodium chloride nasal spray 1 spray, 1 spray, Each Nare, Daily PRN, Dionte Gordon MD  •  traZODone (DESYREL) tablet 150 mg, 150 mg, Oral, Nightly PRN, Dionte Gordon MD  •  warfarin (COUMADIN) tablet 7.5 mg, 7.5 mg, Oral, Daily, Chetna Randolph, ADDY    Data:     Code Status:    There are no questions and answers to display.       No family history on file.    Social History     Socioeconomic History   • Marital status: Single     Spouse name: Not on file   • Number of children: Not on file   • Years of education: Not on file   • Highest education level: Not on file   Tobacco Use   • Smoking status: Never Smoker       Vitals:  Ht 170.2 cm (67\")   Wt 103 kg (226 lb 14.4 oz)   BMI 35.54 kg/m²   T 97.5 HR 70 RR 20 BP 98/56 SpO2 98% (3 lpm)      I/O (24Hr):  No intake or output data in the 24 hours ending 03/09/21 1426    Labs and imaging:      No results found for this or any previous visit (from the past 12 hour(s)).        Physical Examination:        Physical Exam  Vitals and nursing note reviewed.   Constitutional:       Appearance: Normal appearance. She is well-developed.   HENT:      Head: Normocephalic and atraumatic.      Right Ear: External ear normal.      Left Ear: External ear normal.      Nose: Nose normal.      Mouth/Throat:      Mouth: Mucous membranes are moist.   Eyes:      Conjunctiva/sclera: Conjunctivae normal.      Pupils: Pupils are equal, round, and reactive to light.   Cardiovascular:      Rate and Rhythm: Normal rate and regular rhythm.      Pulses: Normal pulses.      Heart sounds: Normal heart sounds.   Pulmonary:      Effort: Pulmonary effort is normal.      " Comments: Diminished breath sounds bases  Abdominal:      General: Bowel sounds are normal. There is no distension.      Palpations: Abdomen is soft.   Musculoskeletal:         General: Normal range of motion.      Cervical back: Normal range of motion and neck supple.      Right lower leg: Edema present.      Left lower leg: Edema present.      Comments: Generalized weakness   Skin:     General: Skin is warm and dry.   Neurological:      General: No focal deficit present.      Mental Status: She is alert and oriented to person, place, and time.      Cranial Nerves: No cranial nerve deficit.   Psychiatric:         Mood and Affect: Mood normal.         Behavior: Behavior normal.         Thought Content: Thought content normal.           Assessment:            * No active hospital problems. *    Past Medical History:   Diagnosis Date   • Depression    • High cholesterol    • Hypertension    • Obesity    • Pulmonary emboli (CMS/HCC)         Plan:        1. Acute hypoxic respiratory failure  2. Bilateral pulmonary emboli  3. S/p COVID-19 pneumonia  4. HTN  5. Chronic anticoagulation  6. HLD  7. Depression  8. Insomnia  9. Hypokalemia    Continue current treatment. Monitor counts. Increase activity. Continue oxygen weaning as tolerated. Labs Thursday. Maintain patient safety. Continue anticoagulation and titrate dose as needed. Aggressive therapies as tolerated.   Discharge planning.     Electronically signed by ADDY Pate on 3/9/2021 at 14:26 CST

## 2021-03-10 PROCEDURE — 97110 THERAPEUTIC EXERCISES: CPT

## 2021-03-10 PROCEDURE — 97116 GAIT TRAINING THERAPY: CPT | Performed by: PHYSICAL THERAPIST

## 2021-03-10 PROCEDURE — 97164 PT RE-EVAL EST PLAN CARE: CPT | Performed by: PHYSICAL THERAPIST

## 2021-03-10 PROCEDURE — 97530 THERAPEUTIC ACTIVITIES: CPT

## 2021-03-10 NOTE — PROGRESS NOTES
DELVIN Villalobos APRN        Internal Medicine Progress Note    3/10/2021   09:03 CST    Name:  Leo Wong  MRN:    [id number]     Acct:     [id number]   Room:  [address] [place] [address] Day: 0     Admit Date: 2/8/2021  5:26 PM  PCP: Provider, No Known    Subjective:     C/C: weakness, shortness of breath    Interval History: Status: stayed the same. Standing in room working with therapy.  No family at bedside. 02 sats stable with 02 at 3 lpm at rest. Requiring 02 at 4 lpm with activity and tolerating without difficulty. Denies pain.  Afebrile. Anxious for discharge plan.         .Review of Systems   Constitutional: Positive for malaise/fatigue. Negative for chills, fever and weight loss.   HENT: Negative for congestion, hoarse voice and nosebleeds.    Eyes: Negative for blurred vision and photophobia.   Cardiovascular: Positive for dyspnea on exertion. Negative for chest pain, near-syncope and orthopnea.   Respiratory: Positive for shortness of breath. Negative for cough and sputum production.    Endocrine: Negative for cold intolerance and polyuria.   Hematologic/Lymphatic: Negative for adenopathy and bleeding problem.   Skin: Negative for dry skin, itching and rash.   Musculoskeletal: Negative for arthritis, back pain and joint pain.   Gastrointestinal: Negative for abdominal pain, diarrhea, heartburn, nausea and vomiting.   Genitourinary: Negative for dysuria and flank pain.   Neurological: Positive for weakness. Negative for dizziness, headaches, paresthesias and seizures.   Psychiatric/Behavioral: Negative for altered mental status and memory loss. The patient is not nervous/anxious.    Allergic/Immunologic: Negative for environmental allergies and hives.         Medications:     Allergies:   Allergies   Allergen Reactions   • Poison Ivy Extract Unknown - Low Severity       Current Meds:   Current Facility-Administered Medications:   •  acetaminophen (TYLENOL) tablet 650 mg, 650 mg,  Oral, Q4H PRN, Dionte Gordon MD  •  albuterol sulfate HFA (PROVENTIL HFA;VENTOLIN HFA;PROAIR HFA) inhaler 2 puff, 2 puff, Inhalation, Q4H PRN, Dionte Gordon MD  •  atorvastatin (LIPITOR) tablet 10 mg, 10 mg, Oral, Daily, Dionte Gordon MD  •  benzocaine-menthol (CHLORASEPTIC) lozenge 1 lozenge, 1 lozenge, Mouth/Throat, Q2H PRN, Dionte Gordon MD  •  bisacodyl (DULCOLAX) suppository 10 mg, 10 mg, Rectal, Daily PRN, Dionte Gordon MD  •  escitalopram (LEXAPRO) tablet 20 mg, 20 mg, Oral, Daily, Dionte Gordon MD  •  fluticasone (FLONASE) 50 MCG/ACT nasal spray 1 spray, 1 spray, Each Nare, BID PRN, Dionte Gordon MD  •  furosemide (LASIX) tablet 20 mg, 20 mg, Oral, Daily, Chetna Randolph, ADDY  •  guaiFENesin (ROBITUSSIN) 100 MG/5ML oral solution 200 mg, 200 mg, Oral, Q4H PRN, Dionte Gordon MD  •  hydroCHLOROthiazide (HYDRODIURIL) tablet 25 mg, 25 mg, Oral, Daily, Dionte Gordon MD  •  hydrOXYzine (ATARAX) tablet 50 mg, 50 mg, Oral, TID PRN, Dionte Gordon MD  •  lisinopril (PRINIVIL,ZESTRIL) tablet 10 mg, 10 mg, Oral, Q24H, Dionte Gordon MD  •  melatonin tablet 6 mg, 6 mg, Oral, Nightly, Dionte Gordon MD  •  nitroglycerin (NITROSTAT) SL tablet 0.4 mg, 0.4 mg, Sublingual, Q5 Min PRN, Dionte Gordon MD  •  ondansetron (ZOFRAN) injection 4 mg, 4 mg, Intravenous, Q6H PRN, Dionte Gordon MD  •  ondansetron ODT (ZOFRAN-ODT) disintegrating tablet 4 mg, 4 mg, Oral, Q6H PRN, Dionte Gordon MD  •  oxymetazoline (AFRIN) nasal spray 2 spray, 2 spray, Each Nare, BID PRN, Dionte Gordon MD  •  phenol (CHLORASEPTIC) 1.4 % liquid 1 spray, 1 spray, Mouth/Throat, Q4H PRN, Dionte Gordon MD  •  polyethylene glycol (MIRALAX) packet 17 g, 17 g, Oral, Daily PRN, Dionte Gordon MD  •  potassium chloride (MICRO-K) CR capsule 40 mEq, 40 mEq, Oral, Daily, Chetna Randolph, APRN  •   "promethazine (PHENERGAN) 12.5 mg in sodium chloride 0.9 % 50 mL, 12.5 mg, Intravenous, Q6H PRN, Dionte Gordon MD  •  promethazine (PHENERGAN) tablet 12.5 mg, 12.5 mg, Oral, Q6H PRN, Dionte Gordon MD  •  sennosides-docusate (PERICOLACE) 8.6-50 MG per tablet 1 tablet, 1 tablet, Oral, Daily PRN, Dionte Gordon MD  •  sodium chloride nasal spray 1 spray, 1 spray, Each Nare, Daily PRN, Dionte Gordon MD  •  traZODone (DESYREL) tablet 150 mg, 150 mg, Oral, Nightly PRN, Dionte Gordon MD  •  warfarin (COUMADIN) tablet 7.5 mg, 7.5 mg, Oral, Daily, Chetna Randolph, ADDY    Data:     Code Status:    There are no questions and answers to display.       No family history on file.    Social History     Socioeconomic History   • Marital status: Single     Spouse name: Not on file   • Number of children: Not on file   • Years of education: Not on file   • Highest education level: Not on file   Tobacco Use   • Smoking status: Never Smoker       Vitals:  Ht 170.2 cm (67\")   Wt 103 kg (226 lb 14.4 oz)   BMI 35.54 kg/m²   T 98.5 HR 73 RR 22 /43 SpO2 99% (3 lpm)      I/O (24Hr):  No intake or output data in the 24 hours ending 03/10/21 0903    Labs and imaging:      No results found for this or any previous visit (from the past 12 hour(s)).        Physical Examination:        Physical Exam  Vitals and nursing note reviewed.   Constitutional:       Appearance: Normal appearance. She is well-developed.   HENT:      Head: Normocephalic and atraumatic.      Right Ear: External ear normal.      Left Ear: External ear normal.      Nose: Nose normal.      Mouth/Throat:      Mouth: Mucous membranes are moist.   Eyes:      Conjunctiva/sclera: Conjunctivae normal.      Pupils: Pupils are equal, round, and reactive to light.   Cardiovascular:      Rate and Rhythm: Normal rate and regular rhythm.      Pulses: Normal pulses.      Heart sounds: Normal heart sounds.   Pulmonary:      Effort: " Pulmonary effort is normal.      Comments: Diminished breath sounds bases  Abdominal:      General: Bowel sounds are normal. There is no distension.      Palpations: Abdomen is soft.   Musculoskeletal:         General: Normal range of motion.      Cervical back: Normal range of motion and neck supple.      Right lower leg: Edema present.      Left lower leg: Edema present.      Comments: Generalized weakness   Skin:     General: Skin is warm and dry.   Neurological:      General: No focal deficit present.      Mental Status: She is alert and oriented to person, place, and time.      Cranial Nerves: No cranial nerve deficit.   Psychiatric:         Mood and Affect: Mood normal.         Behavior: Behavior normal.         Thought Content: Thought content normal.           Assessment:            * No active hospital problems. *    Past Medical History:   Diagnosis Date   • Depression    • High cholesterol    • Hypertension    • Obesity    • Pulmonary emboli (CMS/HCC)         Plan:        1. Acute hypoxic respiratory failure  2. Bilateral pulmonary emboli  3. S/p COVID-19 pneumonia  4. HTN  5. Chronic anticoagulation  6. HLD  7. Depression  8. Insomnia  9. Hypokalemia    Continue current treatment. Monitor counts. Increase activity. Continue oxygen weaning as tolerated. Labs in am. Maintain patient safety. Continue anticoagulation and titrate dose as needed. Aggressive therapies as tolerated.   Discharge planning.     Electronically signed by ADDY Pate on 3/10/2021 at 09:03 CST  I have discussed the care of Leo Wong, including pertinent history and exam findings, with the nurse practitioner.    I have seen and examined the patient and the key elements of all parts of the encounter have been performed by me.  I agree with the assessment, plan and orders as documented by ADDY Nielson, after I modified the exam findings and the plan of treatments and the final version is my approved version of the  assessment.        Electronically signed by Dionte Gordon MD on 3/10/2021 at 15:33 CST

## 2021-03-11 LAB
ANION GAP SERPL CALCULATED.3IONS-SCNC: 9 MMOL/L (ref 5–15)
BASOPHILS # BLD AUTO: 0.05 10*3/MM3 (ref 0–0.2)
BASOPHILS NFR BLD AUTO: 0.9 % (ref 0–1.5)
BUN SERPL-MCNC: 21 MG/DL (ref 8–23)
BUN/CREAT SERPL: 26.6 (ref 7–25)
CALCIUM SPEC-SCNC: 9 MG/DL (ref 8.6–10.5)
CHLORIDE SERPL-SCNC: 100 MMOL/L (ref 98–107)
CO2 SERPL-SCNC: 30 MMOL/L (ref 22–29)
CREAT SERPL-MCNC: 0.79 MG/DL (ref 0.57–1)
DEPRECATED RDW RBC AUTO: 46.4 FL (ref 37–54)
EOSINOPHIL # BLD AUTO: 0.55 10*3/MM3 (ref 0–0.4)
EOSINOPHIL NFR BLD AUTO: 9.4 % (ref 0.3–6.2)
ERYTHROCYTE [DISTWIDTH] IN BLOOD BY AUTOMATED COUNT: 14.4 % (ref 12.3–15.4)
GFR SERPL CREATININE-BSD FRML MDRD: 72 ML/MIN/1.73
GFR SERPL CREATININE-BSD FRML MDRD: 87 ML/MIN/1.73
GLUCOSE SERPL-MCNC: 102 MG/DL (ref 65–99)
HCT VFR BLD AUTO: 31.7 % (ref 34–46.6)
HGB BLD-MCNC: 10.6 G/DL (ref 12–15.9)
IMM GRANULOCYTES # BLD AUTO: 0.05 10*3/MM3 (ref 0–0.05)
IMM GRANULOCYTES NFR BLD AUTO: 0.9 % (ref 0–0.5)
INR PPP: 2 (ref 0.91–1.09)
LYMPHOCYTES # BLD AUTO: 2.21 10*3/MM3 (ref 0.7–3.1)
LYMPHOCYTES NFR BLD AUTO: 37.8 % (ref 19.6–45.3)
MCH RBC QN AUTO: 29 PG (ref 26.6–33)
MCHC RBC AUTO-ENTMCNC: 33.4 G/DL (ref 31.5–35.7)
MCV RBC AUTO: 86.8 FL (ref 79–97)
MONOCYTES # BLD AUTO: 0.56 10*3/MM3 (ref 0.1–0.9)
MONOCYTES NFR BLD AUTO: 9.6 % (ref 5–12)
NEUTROPHILS NFR BLD AUTO: 2.43 10*3/MM3 (ref 1.7–7)
NEUTROPHILS NFR BLD AUTO: 41.4 % (ref 42.7–76)
NRBC BLD AUTO-RTO: 0 /100 WBC (ref 0–0.2)
NT-PROBNP SERPL-MCNC: 152.2 PG/ML (ref 0–900)
PLATELET # BLD AUTO: 208 10*3/MM3 (ref 140–450)
PMV BLD AUTO: 10.7 FL (ref 6–12)
POTASSIUM SERPL-SCNC: 4 MMOL/L (ref 3.5–5.2)
PROTHROMBIN TIME: 22.5 SECONDS (ref 11.9–14.6)
RBC # BLD AUTO: 3.65 10*6/MM3 (ref 3.77–5.28)
SODIUM SERPL-SCNC: 139 MMOL/L (ref 136–145)
WBC # BLD AUTO: 5.85 10*3/MM3 (ref 3.4–10.8)

## 2021-03-11 PROCEDURE — 97116 GAIT TRAINING THERAPY: CPT

## 2021-03-11 PROCEDURE — 80048 BASIC METABOLIC PNL TOTAL CA: CPT | Performed by: INTERNAL MEDICINE

## 2021-03-11 PROCEDURE — 85025 COMPLETE CBC W/AUTO DIFF WBC: CPT | Performed by: INTERNAL MEDICINE

## 2021-03-11 PROCEDURE — 97535 SELF CARE MNGMENT TRAINING: CPT

## 2021-03-11 PROCEDURE — 97530 THERAPEUTIC ACTIVITIES: CPT

## 2021-03-11 PROCEDURE — 83880 ASSAY OF NATRIURETIC PEPTIDE: CPT | Performed by: INTERNAL MEDICINE

## 2021-03-11 PROCEDURE — 97110 THERAPEUTIC EXERCISES: CPT

## 2021-03-11 PROCEDURE — 85610 PROTHROMBIN TIME: CPT | Performed by: INTERNAL MEDICINE

## 2021-03-11 RX ORDER — FUROSEMIDE 20 MG/1
20 TABLET ORAL DAILY PRN
Status: DISCONTINUED | OUTPATIENT
Start: 2021-03-11 | End: 2021-03-15 | Stop reason: HOSPADM

## 2021-03-11 NOTE — PROGRESS NOTES
Evan Gordon M.D.  ADDY Nielson        Internal Medicine Progress Note    3/11/2021   10:44 CST    Name:  Leo Wong  MRN:    [id number]     Acct:     [id number]   Room:  [facility] Day: 0     Admit Date: 2/8/2021  5:26 PM  PCP: Provider, No Known    Subjective:     C/C: weakness, shortness of breath    Interval History: Status: stayed the same. Up to chair.  No family at bedside. 02 sats stable with 02 at 3 lpm at rest. Requiring 02 at 4 lpm with activity and tolerating without difficulty. Denies pain.  Afebrile. Anxious for discharge plan. Insurance denied transfer to inpatient rehab so patient now planning to discharge to home, but is somewhat apprehensive.          .Review of Systems   Constitutional: Positive for malaise/fatigue. Negative for chills, fever and weight loss.   HENT: Negative for congestion, hoarse voice and nosebleeds.    Eyes: Negative for blurred vision and photophobia.   Cardiovascular: Positive for dyspnea on exertion. Negative for chest pain, near-syncope and orthopnea.   Respiratory: Positive for shortness of breath. Negative for cough and sputum production.    Endocrine: Negative for cold intolerance and polyuria.   Hematologic/Lymphatic: Negative for adenopathy and bleeding problem.   Skin: Negative for dry skin, itching and rash.   Musculoskeletal: Negative for arthritis, back pain and joint pain.   Gastrointestinal: Negative for abdominal pain, diarrhea, heartburn, nausea and vomiting.   Genitourinary: Negative for dysuria and flank pain.   Neurological: Positive for weakness. Negative for dizziness, headaches, paresthesias and seizures.   Psychiatric/Behavioral: Negative for altered mental status and memory loss. The patient is not nervous/anxious.    Allergic/Immunologic: Negative for environmental allergies and hives.         Medications:     Allergies:   Allergies   Allergen Reactions   • Poison Ivy Extract Unknown - Low Severity       Current Meds:    Current Facility-Administered Medications:   •  acetaminophen (TYLENOL) tablet 650 mg, 650 mg, Oral, Q4H PRN, Dionte Gordon MD  •  albuterol sulfate HFA (PROVENTIL HFA;VENTOLIN HFA;PROAIR HFA) inhaler 2 puff, 2 puff, Inhalation, Q4H PRN, Dionte Gordon MD  •  atorvastatin (LIPITOR) tablet 10 mg, 10 mg, Oral, Daily, Dionte Gordon MD  •  benzocaine-menthol (CHLORASEPTIC) lozenge 1 lozenge, 1 lozenge, Mouth/Throat, Q2H PRN, Dionte Gordon MD  •  bisacodyl (DULCOLAX) suppository 10 mg, 10 mg, Rectal, Daily PRN, Dionte Gordon MD  •  escitalopram (LEXAPRO) tablet 20 mg, 20 mg, Oral, Daily, Dionte Gordon MD  •  fluticasone (FLONASE) 50 MCG/ACT nasal spray 1 spray, 1 spray, Each Nare, BID PRN, Dionte Gordon MD  •  furosemide (LASIX) tablet 20 mg, 20 mg, Oral, Daily, Chetna Randolph, ADDY  •  guaiFENesin (ROBITUSSIN) 100 MG/5ML oral solution 200 mg, 200 mg, Oral, Q4H PRN, Dionte Gordon MD  •  hydroCHLOROthiazide (HYDRODIURIL) tablet 25 mg, 25 mg, Oral, Daily, Dionte Gordon MD  •  hydrOXYzine (ATARAX) tablet 50 mg, 50 mg, Oral, TID PRN, Dionte Gordon MD  •  lisinopril (PRINIVIL,ZESTRIL) tablet 10 mg, 10 mg, Oral, Q24H, Dionte Gordon MD  •  melatonin tablet 6 mg, 6 mg, Oral, Nightly, Dionte Gordon MD  •  nitroglycerin (NITROSTAT) SL tablet 0.4 mg, 0.4 mg, Sublingual, Q5 Min PRN, Dionte Gordon MD  •  ondansetron (ZOFRAN) injection 4 mg, 4 mg, Intravenous, Q6H PRN, Dionte Gordon MD  •  ondansetron ODT (ZOFRAN-ODT) disintegrating tablet 4 mg, 4 mg, Oral, Q6H PRN, Dionte Gordon MD  •  oxymetazoline (AFRIN) nasal spray 2 spray, 2 spray, Each Nare, BID PRN, Dionte Gordon MD  •  phenol (CHLORASEPTIC) 1.4 % liquid 1 spray, 1 spray, Mouth/Throat, Q4H PRN, Dionte Gordon MD  •  polyethylene glycol (MIRALAX) packet 17 g, 17 g, Oral, Daily PRN, Dionte Gordon MD  •  potassium  "chloride (MICRO-K) CR capsule 40 mEq, 40 mEq, Oral, Daily, Chetna Randolph, APRN  •  promethazine (PHENERGAN) 12.5 mg in sodium chloride 0.9 % 50 mL, 12.5 mg, Intravenous, Q6H PRN, Dionte Gordon MD  •  promethazine (PHENERGAN) tablet 12.5 mg, 12.5 mg, Oral, Q6H PRN, Dionte Gordon MD  •  sennosides-docusate (PERICOLACE) 8.6-50 MG per tablet 1 tablet, 1 tablet, Oral, Daily PRN, Dionte Gordon MD  •  sodium chloride nasal spray 1 spray, 1 spray, Each Nare, Daily PRN, Dionte Gordon MD  •  traZODone (DESYREL) tablet 150 mg, 150 mg, Oral, Nightly PRN, Dionte Gordon MD  •  warfarin (COUMADIN) tablet 7.5 mg, 7.5 mg, Oral, Daily, Chetna Randolph, APRN    Data:     Code Status:    There are no questions and answers to display.       No family history on file.    Social History     Socioeconomic History   • Marital status: Single     Spouse name: Not on file   • Number of children: Not on file   • Years of education: Not on file   • Highest education level: Not on file   Tobacco Use   • Smoking status: Never Smoker       Vitals:  Ht 170.2 cm (67\")   Wt 103 kg (226 lb 14.4 oz)   BMI 35.54 kg/m²   T 98.5 HR 73 RR 22 /43 SpO2 99% (3 lpm)      I/O (24Hr):  No intake or output data in the 24 hours ending 03/11/21 1044    Labs and imaging:      Recent Results (from the past 12 hour(s))   Basic Metabolic Panel    Collection Time: 03/11/21  4:39 AM    Specimen: Blood   Result Value Ref Range    Glucose 102 (H) 65 - 99 mg/dL    BUN 21 8 - 23 mg/dL    Creatinine 0.79 0.57 - 1.00 mg/dL    Sodium 139 136 - 145 mmol/L    Potassium 4.0 3.5 - 5.2 mmol/L    Chloride 100 98 - 107 mmol/L    CO2 30.0 (H) 22.0 - 29.0 mmol/L    Calcium 9.0 8.6 - 10.5 mg/dL    eGFR  African Amer 87 >60 mL/min/1.73    eGFR Non African Amer 72 >60 mL/min/1.73    BUN/Creatinine Ratio 26.6 (H) 7.0 - 25.0    Anion Gap 9.0 5.0 - 15.0 mmol/L   BNP    Collection Time: 03/11/21  4:39 AM    Specimen: Blood "   Result Value Ref Range    proBNP 152.2 0.0 - 900.0 pg/mL   Protime-INR    Collection Time: 03/11/21  4:39 AM    Specimen: Blood   Result Value Ref Range    Protime 22.5 (H) 11.9 - 14.6 Seconds    INR 2.00 (H) 0.91 - 1.09   CBC Auto Differential    Collection Time: 03/11/21  4:39 AM    Specimen: Blood   Result Value Ref Range    WBC 5.85 3.40 - 10.80 10*3/mm3    RBC 3.65 (L) 3.77 - 5.28 10*6/mm3    Hemoglobin 10.6 (L) 12.0 - 15.9 g/dL    Hematocrit 31.7 (L) 34.0 - 46.6 %    MCV 86.8 79.0 - 97.0 fL    MCH 29.0 26.6 - 33.0 pg    MCHC 33.4 31.5 - 35.7 g/dL    RDW 14.4 12.3 - 15.4 %    RDW-SD 46.4 37.0 - 54.0 fl    MPV 10.7 6.0 - 12.0 fL    Platelets 208 140 - 450 10*3/mm3    Neutrophil % 41.4 (L) 42.7 - 76.0 %    Lymphocyte % 37.8 19.6 - 45.3 %    Monocyte % 9.6 5.0 - 12.0 %    Eosinophil % 9.4 (H) 0.3 - 6.2 %    Basophil % 0.9 0.0 - 1.5 %    Immature Grans % 0.9 (H) 0.0 - 0.5 %    Neutrophils, Absolute 2.43 1.70 - 7.00 10*3/mm3    Lymphocytes, Absolute 2.21 0.70 - 3.10 10*3/mm3    Monocytes, Absolute 0.56 0.10 - 0.90 10*3/mm3    Eosinophils, Absolute 0.55 (H) 0.00 - 0.40 10*3/mm3    Basophils, Absolute 0.05 0.00 - 0.20 10*3/mm3    Immature Grans, Absolute 0.05 0.00 - 0.05 10*3/mm3    nRBC 0.0 0.0 - 0.2 /100 WBC           Physical Examination:        Physical Exam  Vitals and nursing note reviewed.   Constitutional:       Appearance: Normal appearance. She is well-developed.   HENT:      Head: Normocephalic and atraumatic.      Right Ear: External ear normal.      Left Ear: External ear normal.      Nose: Nose normal.      Mouth/Throat:      Mouth: Mucous membranes are moist.   Eyes:      Conjunctiva/sclera: Conjunctivae normal.      Pupils: Pupils are equal, round, and reactive to light.   Cardiovascular:      Rate and Rhythm: Normal rate and regular rhythm.      Pulses: Normal pulses.      Heart sounds: Normal heart sounds.   Pulmonary:      Effort: Pulmonary effort is normal.      Comments: Diminished breath sounds  bases  Abdominal:      General: Bowel sounds are normal. There is no distension.      Palpations: Abdomen is soft.   Musculoskeletal:         General: Normal range of motion.      Cervical back: Normal range of motion and neck supple.      Right lower leg: Edema present.      Left lower leg: Edema present.      Comments: Generalized weakness   Skin:     General: Skin is warm and dry.   Neurological:      General: No focal deficit present.      Mental Status: She is alert and oriented to person, place, and time.      Cranial Nerves: No cranial nerve deficit.   Psychiatric:         Mood and Affect: Mood normal.         Behavior: Behavior normal.         Thought Content: Thought content normal.           Assessment:            * No active hospital problems. *    Past Medical History:   Diagnosis Date   • Depression    • High cholesterol    • Hypertension    • Obesity    • Pulmonary emboli (CMS/HCC)         Plan:        1. Acute hypoxic respiratory failure  2. Bilateral pulmonary emboli  3. S/p COVID-19 pneumonia  4. HTN  5. Chronic anticoagulation  6. HLD  7. Depression  8. Insomnia  9. Hypokalemia    Continue current treatment. Monitor counts. Increase activity. Continue oxygen weaning as tolerated. Labs Monday. Maintain patient safety. Continue anticoagulation and titrate dose as needed. Aggressive therapies as tolerated.   Discharge planning.     Electronically signed by ADDY Pate on 3/11/2021 at 10:44 CST

## 2021-03-12 PROCEDURE — 97116 GAIT TRAINING THERAPY: CPT

## 2021-03-12 NOTE — PROGRESS NOTES
DELVIN Villalobos APRN        Internal Medicine Progress Note    3/12/2021   09:38 CST    Name:  Leo Wong  MRN:    [id number]     Acct:     [id number]   Room:  [address] [place] [address] Day: 0     Admit Date: 2/8/2021  5:26 PM  PCP: Provider, No Known    Subjective:     C/C: weakness, shortness of breath    Interval History: Status: stayed the same. Up to chair. No family at bedside. 02 sats stable with 02 at 3 lpm at rest. Requiring 02 at 4 lpm with activity and tolerating without difficulty. Denies pain.  Afebrile. Plans for discharge to home next week.       .Review of Systems   Constitutional: Positive for malaise/fatigue. Negative for chills, fever and weight loss.   HENT: Negative for congestion, hoarse voice and nosebleeds.    Eyes: Negative for blurred vision and photophobia.   Cardiovascular: Positive for dyspnea on exertion. Negative for chest pain, near-syncope and orthopnea.   Respiratory: Positive for shortness of breath. Negative for cough and sputum production.    Endocrine: Negative for cold intolerance and polyuria.   Hematologic/Lymphatic: Negative for adenopathy and bleeding problem.   Skin: Negative for dry skin, itching and rash.   Musculoskeletal: Negative for arthritis, back pain and joint pain.   Gastrointestinal: Negative for abdominal pain, diarrhea, heartburn, nausea and vomiting.   Genitourinary: Negative for dysuria and flank pain.   Neurological: Positive for weakness. Negative for dizziness, headaches, paresthesias and seizures.   Psychiatric/Behavioral: Negative for altered mental status and memory loss. The patient is not nervous/anxious.    Allergic/Immunologic: Negative for environmental allergies and hives.         Medications:     Allergies:   Allergies   Allergen Reactions   • Poison Ivy Extract Unknown - Low Severity       Current Meds:   Current Facility-Administered Medications:   •  acetaminophen (TYLENOL) tablet 650 mg, 650 mg, Oral, Q4H PRN,  Dionte Gordon MD  •  albuterol sulfate HFA (PROVENTIL HFA;VENTOLIN HFA;PROAIR HFA) inhaler 2 puff, 2 puff, Inhalation, Q4H PRN, Dionte Gordon MD  •  atorvastatin (LIPITOR) tablet 10 mg, 10 mg, Oral, Daily, Dionte Gordon MD  •  benzocaine-menthol (CHLORASEPTIC) lozenge 1 lozenge, 1 lozenge, Mouth/Throat, Q2H PRN, Dionte Gordon MD  •  bisacodyl (DULCOLAX) suppository 10 mg, 10 mg, Rectal, Daily PRN, Dionte Gordon MD  •  escitalopram (LEXAPRO) tablet 20 mg, 20 mg, Oral, Daily, Dionte Gordon MD  •  fluticasone (FLONASE) 50 MCG/ACT nasal spray 1 spray, 1 spray, Each Nare, BID PRN, Dionte Gordon MD  •  furosemide (LASIX) tablet 20 mg, 20 mg, Oral, Daily PRN, Chetna Randolph APRN  •  guaiFENesin (ROBITUSSIN) 100 MG/5ML oral solution 200 mg, 200 mg, Oral, Q4H PRN, Dionte Gordon MD  •  hydroCHLOROthiazide (HYDRODIURIL) tablet 25 mg, 25 mg, Oral, Daily, Dionte Gordon MD  •  hydrOXYzine (ATARAX) tablet 50 mg, 50 mg, Oral, TID PRN, Dionte Gordon MD  •  lisinopril (PRINIVIL,ZESTRIL) tablet 10 mg, 10 mg, Oral, Q24H, Dionte Gordon MD  •  melatonin tablet 6 mg, 6 mg, Oral, Nightly, Dionte Gordon MD  •  nitroglycerin (NITROSTAT) SL tablet 0.4 mg, 0.4 mg, Sublingual, Q5 Min PRN, Dionte Gordon MD  •  ondansetron (ZOFRAN) injection 4 mg, 4 mg, Intravenous, Q6H PRN, Dionte Gordon MD  •  ondansetron ODT (ZOFRAN-ODT) disintegrating tablet 4 mg, 4 mg, Oral, Q6H PRN, Dionte Gordon MD  •  oxymetazoline (AFRIN) nasal spray 2 spray, 2 spray, Each Nare, BID PRN, Dionte Gordon MD  •  phenol (CHLORASEPTIC) 1.4 % liquid 1 spray, 1 spray, Mouth/Throat, Q4H PRN, Dionte Gordon MD  •  polyethylene glycol (MIRALAX) packet 17 g, 17 g, Oral, Daily PRN, Dionte Gordon MD  •  promethazine (PHENERGAN) 12.5 mg in sodium chloride 0.9 % 50 mL, 12.5 mg, Intravenous, Q6H PRN, Dionte Gordon,  "MD  •  promethazine (PHENERGAN) tablet 12.5 mg, 12.5 mg, Oral, Q6H PRN, Dionte Gordon MD  •  sennosides-docusate (PERICOLACE) 8.6-50 MG per tablet 1 tablet, 1 tablet, Oral, Daily PRN, Dionte Gordon MD  •  sodium chloride nasal spray 1 spray, 1 spray, Each Nare, Daily PRN, Dionte Gordon MD  •  traZODone (DESYREL) tablet 150 mg, 150 mg, Oral, Nightly PRN, Dionte Gordon MD  •  warfarin (COUMADIN) tablet 7.5 mg, 7.5 mg, Oral, Daily, Chetna Randolph, ADDY    Data:     Code Status:    There are no questions and answers to display.       No family history on file.    Social History     Socioeconomic History   • Marital status: Single     Spouse name: Not on file   • Number of children: Not on file   • Years of education: Not on file   • Highest education level: Not on file   Tobacco Use   • Smoking status: Never Smoker       Vitals:  Ht 170.2 cm (67\")   Wt 103 kg (226 lb 14.4 oz)   BMI 35.54 kg/m²   T 98.5 HR 75 RR 18 /55 SpO2 99% (3 lpm)      I/O (24Hr):  No intake or output data in the 24 hours ending 03/12/21 0938    Labs and imaging:      No results found for this or any previous visit (from the past 12 hour(s)).        Physical Examination:        Physical Exam  Vitals and nursing note reviewed.   Constitutional:       Appearance: Normal appearance. She is well-developed.   HENT:      Head: Normocephalic and atraumatic.      Right Ear: External ear normal.      Left Ear: External ear normal.      Nose: Nose normal.      Mouth/Throat:      Mouth: Mucous membranes are moist.   Eyes:      Conjunctiva/sclera: Conjunctivae normal.      Pupils: Pupils are equal, round, and reactive to light.   Cardiovascular:      Rate and Rhythm: Normal rate and regular rhythm.      Pulses: Normal pulses.      Heart sounds: Normal heart sounds.   Pulmonary:      Effort: Pulmonary effort is normal.      Comments: Diminished breath sounds bases  Abdominal:      General: Bowel sounds are " normal. There is no distension.      Palpations: Abdomen is soft.   Musculoskeletal:         General: Normal range of motion.      Cervical back: Normal range of motion and neck supple.      Right lower leg: Edema present.      Left lower leg: Edema present.      Comments: Generalized weakness   Skin:     General: Skin is warm and dry.   Neurological:      General: No focal deficit present.      Mental Status: She is alert and oriented to person, place, and time.      Cranial Nerves: No cranial nerve deficit.   Psychiatric:         Mood and Affect: Mood normal.         Behavior: Behavior normal.         Thought Content: Thought content normal.           Assessment:            * No active hospital problems. *    Past Medical History:   Diagnosis Date   • Depression    • High cholesterol    • Hypertension    • Obesity    • Pulmonary emboli (CMS/HCC)         Plan:        1. Acute hypoxic respiratory failure  2. Bilateral pulmonary emboli  3. S/p COVID-19 pneumonia  4. HTN  5. Chronic anticoagulation  6. HLD  7. Depression  8. Insomnia  9. Hypokalemia    Continue current treatment. Monitor counts. Increase activity. Continue oxygen weaning as tolerated. Labs Monday. Maintain patient safety. Continue anticoagulation and titrate dose as needed. Aggressive therapies as tolerated.   Discharge planning.     Electronically signed by ADDY Pate on 3/12/2021 at 09:38 CST  I have discussed the care of Leo Wong, including pertinent history and exam findings, with the nurse practitioner.    I have seen and examined the patient and the key elements of all parts of the encounter have been performed by me.  I agree with the assessment, plan and orders as documented by ADDY Nielson, after I modified the exam findings and the plan of treatments and the final version is my approved version of the assessment.        Electronically signed by Dionte Gordon MD on 3/12/2021 at 20:57 CST

## 2021-03-13 NOTE — PROGRESS NOTES
DELVIN Villalobos APRN Whitley Swift, APRN         Internal Medicine Progress Note    3/13/2021   14:48 CST    Name:  Leo Wong  MRN:    [id number]     Acct:     [id number]   Room:  [address] [place] [address] Day: 0     Admit Date: 2/8/2021  5:26 PM  PCP: Provider, No Known    Subjective:     C/C: weakness, shortness of breath    Interval History: Status: stayed the same. Pt asleep in bed, awakes easily. No family at bedside. 02 sats stable with 02 at 3 lpm at rest. Requiring 02 at 4 lpm with activity and tolerating without difficulty. Denies pain.  Afebrile. Plans for discharge to home next week.       .Review of Systems   Constitutional: Positive for malaise/fatigue. Negative for chills, fever and weight loss.   HENT: Negative for congestion, hoarse voice and nosebleeds.    Eyes: Negative for blurred vision and photophobia.   Cardiovascular: Positive for dyspnea on exertion. Negative for chest pain, near-syncope and orthopnea.   Respiratory: Positive for shortness of breath. Negative for cough and sputum production.    Endocrine: Negative for cold intolerance and polyuria.   Hematologic/Lymphatic: Negative for adenopathy and bleeding problem.   Skin: Negative for dry skin, itching and rash.   Musculoskeletal: Negative for arthritis, back pain and joint pain.   Gastrointestinal: Negative for abdominal pain, diarrhea, heartburn, nausea and vomiting.   Genitourinary: Negative for dysuria and flank pain.   Neurological: Positive for weakness. Negative for dizziness, headaches, paresthesias and seizures.   Psychiatric/Behavioral: Negative for altered mental status and memory loss. The patient is not nervous/anxious.    Allergic/Immunologic: Negative for environmental allergies and hives.         Medications:     Allergies:   Allergies   Allergen Reactions   • Poison Ivy Extract Unknown - Low Severity       Current Meds:   Current Facility-Administered Medications:   •  acetaminophen (TYLENOL)  tablet 650 mg, 650 mg, Oral, Q4H PRN, Dionte Gordon MD  •  albuterol sulfate HFA (PROVENTIL HFA;VENTOLIN HFA;PROAIR HFA) inhaler 2 puff, 2 puff, Inhalation, Q4H PRN, Dionte Gordon MD  •  atorvastatin (LIPITOR) tablet 10 mg, 10 mg, Oral, Daily, Dionte Gordon MD  •  benzocaine-menthol (CHLORASEPTIC) lozenge 1 lozenge, 1 lozenge, Mouth/Throat, Q2H PRN, Dionte Gordon MD  •  bisacodyl (DULCOLAX) suppository 10 mg, 10 mg, Rectal, Daily PRN, Dionte Gordon MD  •  escitalopram (LEXAPRO) tablet 20 mg, 20 mg, Oral, Daily, Dionte Gordon MD  •  fluticasone (FLONASE) 50 MCG/ACT nasal spray 1 spray, 1 spray, Each Nare, BID PRN, Dionte Gordon MD  •  furosemide (LASIX) tablet 20 mg, 20 mg, Oral, Daily PRN, Chetna Randolph APRN  •  guaiFENesin (ROBITUSSIN) 100 MG/5ML oral solution 200 mg, 200 mg, Oral, Q4H PRN, Dionte Gordon MD  •  hydroCHLOROthiazide (HYDRODIURIL) tablet 25 mg, 25 mg, Oral, Daily, Dionte Gordon MD  •  hydrOXYzine (ATARAX) tablet 50 mg, 50 mg, Oral, TID PRN, Dionte Gordon MD  •  lisinopril (PRINIVIL,ZESTRIL) tablet 10 mg, 10 mg, Oral, Q24H, Dionte Gordon MD  •  melatonin tablet 6 mg, 6 mg, Oral, Nightly, Dionte Gordon MD  •  nitroglycerin (NITROSTAT) SL tablet 0.4 mg, 0.4 mg, Sublingual, Q5 Min PRN, Dionte Gordon MD  •  ondansetron (ZOFRAN) injection 4 mg, 4 mg, Intravenous, Q6H PRN, Dionte Gordon MD  •  ondansetron ODT (ZOFRAN-ODT) disintegrating tablet 4 mg, 4 mg, Oral, Q6H PRN, Dionte Gordon MD  •  oxymetazoline (AFRIN) nasal spray 2 spray, 2 spray, Each Nare, BID PRN, Dionte Gordon MD  •  phenol (CHLORASEPTIC) 1.4 % liquid 1 spray, 1 spray, Mouth/Throat, Q4H PRN, Dionte Gordon MD  •  polyethylene glycol (MIRALAX) packet 17 g, 17 g, Oral, Daily PRN, Dionte Gordon MD  •  promethazine (PHENERGAN) 12.5 mg in sodium chloride 0.9 % 50 mL, 12.5 mg,  "Intravenous, Q6H PRN, Dionte Gordon MD  •  promethazine (PHENERGAN) tablet 12.5 mg, 12.5 mg, Oral, Q6H PRN, Dionte Gordon MD  •  sennosides-docusate (PERICOLACE) 8.6-50 MG per tablet 1 tablet, 1 tablet, Oral, Daily PRN, Dionte Gordon MD  •  sodium chloride nasal spray 1 spray, 1 spray, Each Nare, Daily PRN, Dionte Gordon MD  •  traZODone (DESYREL) tablet 150 mg, 150 mg, Oral, Nightly PRN, Dionte Gordon MD  •  warfarin (COUMADIN) tablet 7.5 mg, 7.5 mg, Oral, Daily, Chetna Randolph APRN    Data:     Code Status:    There are no questions and answers to display.       No family history on file.    Social History     Socioeconomic History   • Marital status: Single     Spouse name: Not on file   • Number of children: Not on file   • Years of education: Not on file   • Highest education level: Not on file   Tobacco Use   • Smoking status: Never Smoker       Vitals:  Ht 170.2 cm (67\")   Wt 103 kg (226 lb 14.4 oz)   BMI 35.54 kg/m²   T 97.9 HR 72 RR 18 /65 SpO2 99% (3 lpm)      I/O (24Hr):  No intake or output data in the 24 hours ending 03/13/21 1448    Labs and imaging:      No results found for this or any previous visit (from the past 12 hour(s)).        Physical Examination:        Physical Exam  Vitals and nursing note reviewed.   Constitutional:       Appearance: Normal appearance. She is well-developed.   HENT:      Head: Normocephalic and atraumatic.      Right Ear: External ear normal.      Left Ear: External ear normal.      Nose: Nose normal.      Mouth/Throat:      Mouth: Mucous membranes are moist.   Eyes:      Conjunctiva/sclera: Conjunctivae normal.      Pupils: Pupils are equal, round, and reactive to light.   Cardiovascular:      Rate and Rhythm: Normal rate and regular rhythm.      Pulses: Normal pulses.      Heart sounds: Normal heart sounds.   Pulmonary:      Effort: Pulmonary effort is normal.      Comments: Diminished breath sounds " bases  Abdominal:      General: Bowel sounds are normal. There is no distension.      Palpations: Abdomen is soft.   Musculoskeletal:         General: Normal range of motion.      Cervical back: Normal range of motion and neck supple.      Right lower leg: Edema present.      Left lower leg: Edema present.      Comments: Generalized weakness   Skin:     General: Skin is warm and dry.   Neurological:      General: No focal deficit present.      Mental Status: She is alert and oriented to person, place, and time.      Cranial Nerves: No cranial nerve deficit.   Psychiatric:         Mood and Affect: Mood normal.         Behavior: Behavior normal.         Thought Content: Thought content normal.           Assessment:            * No active hospital problems. *    Past Medical History:   Diagnosis Date   • Depression    • High cholesterol    • Hypertension    • Obesity    • Pulmonary emboli (CMS/HCC)         Plan:        1. Acute hypoxic respiratory failure  2. Bilateral pulmonary emboli  3. S/p COVID-19 pneumonia  4. HTN  5. Chronic anticoagulation  6. HLD  7. Depression  8. Insomnia  9. Hypokalemia    Continue current treatment. Monitor counts. Increase activity. Continue oxygen weaning as tolerated. Labs Monday. Maintain patient safety. Continue anticoagulation and titrate dose as needed. Aggressive therapies as tolerated.   Discharge planning.     Electronically signed by ADDY Marin on 3/13/2021 at 14:48 CST

## 2021-03-14 PROCEDURE — 97530 THERAPEUTIC ACTIVITIES: CPT

## 2021-03-14 PROCEDURE — 97116 GAIT TRAINING THERAPY: CPT

## 2021-03-14 NOTE — PROGRESS NOTES
Evan Gordon M.D.  ADDY Nielson APRN         Internal Medicine Progress Note    3/14/2021   14:36 CDT    Name:  Leo Wong  MRN:    [id number]     Acct:     [id number]   Room:  [address] [place] [address] Day: 0     Admit Date: 2/8/2021  5:26 PM  PCP: Provider, No Known    Subjective:     C/C: weakness, shortness of breath    Interval History: Status: stayed the same. Pt asleep in bed, awakes easily. No family at bedside. 02 sats stable with 02 at 3 lpm at rest. Requiring 02 at 4 lpm with activity and tolerating without difficulty. Denies pain.  Afebrile. Plans for discharge to home tomorrow, states she needs her disability parking tag paperwork completed prior to discharge.       .Review of Systems   Constitutional: Positive for malaise/fatigue. Negative for chills, fever and weight loss.   HENT: Negative for congestion, hoarse voice and nosebleeds.    Eyes: Negative for blurred vision and photophobia.   Cardiovascular: Positive for dyspnea on exertion. Negative for chest pain, near-syncope and orthopnea.   Respiratory: Positive for shortness of breath. Negative for cough and sputum production.    Endocrine: Negative for cold intolerance and polyuria.   Hematologic/Lymphatic: Negative for adenopathy and bleeding problem.   Skin: Negative for dry skin, itching and rash.   Musculoskeletal: Negative for arthritis, back pain and joint pain.   Gastrointestinal: Negative for abdominal pain, diarrhea, heartburn, nausea and vomiting.   Genitourinary: Negative for dysuria and flank pain.   Neurological: Positive for weakness. Negative for dizziness, headaches, paresthesias and seizures.   Psychiatric/Behavioral: Negative for altered mental status and memory loss. The patient is not nervous/anxious.    Allergic/Immunologic: Negative for environmental allergies and hives.         Medications:     Allergies:   Allergies   Allergen Reactions    Poison Ivy Extract Unknown - Low Severity        Current Meds:   Current Facility-Administered Medications:     acetaminophen (TYLENOL) tablet 650 mg, 650 mg, Oral, Q4H PRN, Dionte Gordon MD    albuterol sulfate HFA (PROVENTIL HFA;VENTOLIN HFA;PROAIR HFA) inhaler 2 puff, 2 puff, Inhalation, Q4H PRN, Dionte Gordon MD    atorvastatin (LIPITOR) tablet 10 mg, 10 mg, Oral, Daily, Dionte Gordon MD    benzocaine-menthol (CHLORASEPTIC) lozenge 1 lozenge, 1 lozenge, Mouth/Throat, Q2H PRN, Dionte Gordon MD    bisacodyl (DULCOLAX) suppository 10 mg, 10 mg, Rectal, Daily PRN, Dionte Gordon MD    escitalopram (LEXAPRO) tablet 20 mg, 20 mg, Oral, Daily, Dionte Gordon MD    fluticasone (FLONASE) 50 MCG/ACT nasal spray 1 spray, 1 spray, Each Nare, BID PRN, Dionte Gordon MD    furosemide (LASIX) tablet 20 mg, 20 mg, Oral, Daily PRN, Chetna Randolph APRN    guaiFENesin (ROBITUSSIN) 100 MG/5ML oral solution 200 mg, 200 mg, Oral, Q4H PRN, Dionte Gordon MD    hydroCHLOROthiazide (HYDRODIURIL) tablet 25 mg, 25 mg, Oral, Daily, Dionte Gordon MD    hydrOXYzine (ATARAX) tablet 50 mg, 50 mg, Oral, TID PRN, Dionte Gordon MD    lisinopril (PRINIVIL,ZESTRIL) tablet 10 mg, 10 mg, Oral, Q24H, Dionte Gordon MD    melatonin tablet 6 mg, 6 mg, Oral, Nightly, Dionte Gordon MD    nitroglycerin (NITROSTAT) SL tablet 0.4 mg, 0.4 mg, Sublingual, Q5 Min PRN, Dionte Gordon MD    ondansetron (ZOFRAN) injection 4 mg, 4 mg, Intravenous, Q6H PRN, Dionte Gordon MD    ondansetron ODT (ZOFRAN-ODT) disintegrating tablet 4 mg, 4 mg, Oral, Q6H PRN, Dionte Gordon MD    oxymetazoline (AFRIN) nasal spray 2 spray, 2 spray, Each Nare, BID PRN, Dionte Gordon MD    phenol (CHLORASEPTIC) 1.4 % liquid 1 spray, 1 spray, Mouth/Throat, Q4H PRN, Dionte Gordon MD    polyethylene glycol (MIRALAX) packet 17 g, 17 g, Oral, Daily PRN, Dionte Gordon MD     "promethazine (PHENERGAN) 12.5 mg in sodium chloride 0.9 % 50 mL, 12.5 mg, Intravenous, Q6H PRN, Dionte Gordon MD    promethazine (PHENERGAN) tablet 12.5 mg, 12.5 mg, Oral, Q6H PRN, Dionte Gordon MD    sennosides-docusate (PERICOLACE) 8.6-50 MG per tablet 1 tablet, 1 tablet, Oral, Daily PRN, Dionte Gordon MD    sodium chloride nasal spray 1 spray, 1 spray, Each Nare, Daily PRN, Dionte Gordon MD    traZODone (DESYREL) tablet 150 mg, 150 mg, Oral, Nightly PRN, Dionte Gordon MD    warfarin (COUMADIN) tablet 7.5 mg, 7.5 mg, Oral, Daily, Chetna Randolph, APRN    Data:     Code Status:    There are no questions and answers to display.       No family history on file.    Social History     Socioeconomic History    Marital status: Single     Spouse name: Not on file    Number of children: Not on file    Years of education: Not on file    Highest education level: Not on file   Tobacco Use    Smoking status: Never Smoker       Vitals:  Ht 170.2 cm (67\")   Wt 103 kg (226 lb 14.4 oz)   BMI 35.54 kg/m²   T 98.2 HR 72 RR 20 /71 SpO2 98% (3 lpm)      I/O (24Hr):  No intake or output data in the 24 hours ending 03/14/21 1436    Labs and imaging:      No results found for this or any previous visit (from the past 12 hour(s)).        Physical Examination:        Physical Exam  Vitals and nursing note reviewed.   Constitutional:       Appearance: Normal appearance. She is well-developed.   HENT:      Head: Normocephalic and atraumatic.      Right Ear: External ear normal.      Left Ear: External ear normal.      Nose: Nose normal.      Mouth/Throat:      Mouth: Mucous membranes are moist.   Eyes:      Conjunctiva/sclera: Conjunctivae normal.      Pupils: Pupils are equal, round, and reactive to light.   Cardiovascular:      Rate and Rhythm: Normal rate and regular rhythm.      Pulses: Normal pulses.      Heart sounds: Normal heart sounds.   Pulmonary:      Effort: Pulmonary " effort is normal.      Comments: Diminished breath sounds bases  Abdominal:      General: Bowel sounds are normal. There is no distension.      Palpations: Abdomen is soft.   Musculoskeletal:         General: Normal range of motion.      Cervical back: Normal range of motion and neck supple.      Right lower leg: Edema present.      Left lower leg: Edema present.      Comments: Generalized weakness   Skin:     General: Skin is warm and dry.   Neurological:      General: No focal deficit present.      Mental Status: She is alert and oriented to person, place, and time.      Cranial Nerves: No cranial nerve deficit.   Psychiatric:         Mood and Affect: Mood normal.         Behavior: Behavior normal.         Thought Content: Thought content normal.           Assessment:            * No active hospital problems. *    Past Medical History:   Diagnosis Date    Depression     High cholesterol     Hypertension     Obesity     Pulmonary emboli (CMS/HCC)         Plan:        Acute hypoxic respiratory failure  Bilateral pulmonary emboli  S/p COVID-19 pneumonia  HTN  Chronic anticoagulation  HLD  Depression  Insomnia  Hypokalemia    Continue current treatment. Monitor counts. Increase activity. Continue oxygen weaning as tolerated. Labs Monday. Maintain patient safety. Continue anticoagulation and titrate dose as needed. Aggressive therapies as tolerated.   Discharge planning for tomorrow.     Electronically signed by ADDY Marin on 3/14/2021 at 14:36 CDT  I have discussed the care of Leo Wong, including pertinent history and exam findings, with the nurse practitioner.    I have seen and examined the patient and the key elements of all parts of the encounter have been performed by me.  I agree with the assessment, plan and orders as documented by Lizz DALY, after I modified the exam findings and the plan of treatments and the final version is my approved version of the assessment.        Electronically  signed by Dionte Gordon MD on 3/14/2021 at 19:22 CDT

## 2021-03-15 VITALS — BODY MASS INDEX: 35.5 KG/M2 | WEIGHT: 226.2 LBS | HEIGHT: 67 IN

## 2021-03-15 LAB
ANION GAP SERPL CALCULATED.3IONS-SCNC: 10 MMOL/L (ref 5–15)
BASOPHILS # BLD AUTO: 0.03 10*3/MM3 (ref 0–0.2)
BASOPHILS NFR BLD AUTO: 0.6 % (ref 0–1.5)
BUN SERPL-MCNC: 20 MG/DL (ref 8–23)
BUN/CREAT SERPL: 26.7 (ref 7–25)
CALCIUM SPEC-SCNC: 9.3 MG/DL (ref 8.6–10.5)
CHLORIDE SERPL-SCNC: 101 MMOL/L (ref 98–107)
CO2 SERPL-SCNC: 30 MMOL/L (ref 22–29)
CREAT SERPL-MCNC: 0.75 MG/DL (ref 0.57–1)
DEPRECATED RDW RBC AUTO: 45.5 FL (ref 37–54)
EOSINOPHIL # BLD AUTO: 0.55 10*3/MM3 (ref 0–0.4)
EOSINOPHIL NFR BLD AUTO: 11.6 % (ref 0.3–6.2)
ERYTHROCYTE [DISTWIDTH] IN BLOOD BY AUTOMATED COUNT: 14.5 % (ref 12.3–15.4)
GFR SERPL CREATININE-BSD FRML MDRD: 76 ML/MIN/1.73
GFR SERPL CREATININE-BSD FRML MDRD: 92 ML/MIN/1.73
GLUCOSE SERPL-MCNC: 135 MG/DL (ref 65–99)
HCT VFR BLD AUTO: 33.4 % (ref 34–46.6)
HGB BLD-MCNC: 11.2 G/DL (ref 12–15.9)
IMM GRANULOCYTES # BLD AUTO: 0.02 10*3/MM3 (ref 0–0.05)
IMM GRANULOCYTES NFR BLD AUTO: 0.4 % (ref 0–0.5)
INR PPP: 1.88 (ref 0.91–1.09)
LYMPHOCYTES # BLD AUTO: 1.87 10*3/MM3 (ref 0.7–3.1)
LYMPHOCYTES NFR BLD AUTO: 39.3 % (ref 19.6–45.3)
MCH RBC QN AUTO: 28.8 PG (ref 26.6–33)
MCHC RBC AUTO-ENTMCNC: 33.5 G/DL (ref 31.5–35.7)
MCV RBC AUTO: 85.9 FL (ref 79–97)
MONOCYTES # BLD AUTO: 0.44 10*3/MM3 (ref 0.1–0.9)
MONOCYTES NFR BLD AUTO: 9.2 % (ref 5–12)
NEUTROPHILS NFR BLD AUTO: 1.85 10*3/MM3 (ref 1.7–7)
NEUTROPHILS NFR BLD AUTO: 38.9 % (ref 42.7–76)
NRBC BLD AUTO-RTO: 0 /100 WBC (ref 0–0.2)
NT-PROBNP SERPL-MCNC: 211.3 PG/ML (ref 0–900)
PLATELET # BLD AUTO: 207 10*3/MM3 (ref 140–450)
PMV BLD AUTO: 10.3 FL (ref 6–12)
POTASSIUM SERPL-SCNC: 3.4 MMOL/L (ref 3.5–5.2)
PROTHROMBIN TIME: 21.4 SECONDS (ref 11.9–14.6)
RBC # BLD AUTO: 3.89 10*6/MM3 (ref 3.77–5.28)
SODIUM SERPL-SCNC: 141 MMOL/L (ref 136–145)
WBC # BLD AUTO: 4.76 10*3/MM3 (ref 3.4–10.8)

## 2021-03-15 PROCEDURE — 97116 GAIT TRAINING THERAPY: CPT

## 2021-03-15 PROCEDURE — 80048 BASIC METABOLIC PNL TOTAL CA: CPT | Performed by: INTERNAL MEDICINE

## 2021-03-15 PROCEDURE — 83880 ASSAY OF NATRIURETIC PEPTIDE: CPT | Performed by: INTERNAL MEDICINE

## 2021-03-15 PROCEDURE — 85610 PROTHROMBIN TIME: CPT | Performed by: INTERNAL MEDICINE

## 2021-03-15 PROCEDURE — 85025 COMPLETE CBC W/AUTO DIFF WBC: CPT | Performed by: INTERNAL MEDICINE

## 2021-03-15 NOTE — DISCHARGE SUMMARY
DELVIN Villalobos APRN      Internal Medicine Discharge Summary    Patient ID: Leo Wong  MRN: [id number]     Acct:  [id number]       Patient's PCP: Provider, No Known    Admit Date: 2/8/2021     Discharge Date: 3/15/2021      Admitting Physician: Dionte Gordon MD    Discharge Physician: ADDY Pate     Active Discharge Diagnoses:  Acute hypoxic respiratory failure  Bilateral pulmonary emboli  S/p COVID-19 pneumonia  HTN  Chronic anticoagulation  HLD  Depression  Insomnia  Hypokalemia - resolved  Hospital Problems    * No active hospital problems. *     Past Medical History:   Diagnosis Date    Depression     High cholesterol     Hypertension     Obesity     Pulmonary emboli (CMS/HCC)        The patient was seen and examined on the day of discharge and this discharge summary is in conjunction with any daily progress note from day of discharge.    Code Status:    There are no questions and answers to display.       Hospital Course: Leo Wong is a  70 y.o.  female who presents with need for continued oxygen weaning and rehabilitation efforts following a recent acute care stay. The patient had been in her usual state of health when she developed increased shortness of breath with cough. She was found positive for COVID-19 on 12/10/20. Her symptoms progressed and she presented to ER with her complaints on 12/22/20. Workup positive for bilateral pulmonary emboli with bilateral pneumonia and pulmonary edema. She was treated with IV antibiotics, IV steroids and nebulizer treatments. She required vapotherm alternating with CPAP therapy. She was seen in consultation by pulmonology. The patient was placed on therapeutic lovenox and was evaluated by cardiology with no indication found for thrombectomy and no evidence of right heart strain. The patient developed transaminitis felt secondary to infection. The patient stabilized, but has continued with  increased oxygen demand. She has been weaned down to 02 at 4lpm, but due to nasal congestion, she prefers a simple oxygen mask at 8 lpm with CPAP at night/with sleep. Due to her need for continued oxygen weaning and rehabilitation efforts, she transferred to our facility.   The patient continued with titration of coumadin dosing to therapeutic INR. Initially, she required 02 at 8-10 lpm per oxygen mask at rest and up to 12 lpm with any activity. She slowly progressed with therapy - impeded by hypoxia and increased oxygen demand. She diuresed well and developed some electrolyte derangements that were corrected. Lasix has since been changed to prn. She had some intermittent low grade fevers with negative workup. She has progressed to the point that she is largely independent for ADLs. She maintains adequate 02 sats with 02 at 2 lpm at rest and up to 4 lpm with activity. She was hopeful for discharge to acute rehab for continued oxygen weaning and rehabilitation efforts, but this was denied by her insurance. She is now felt stable for discharge to home with close outpatient follow up.Counts have been stable. Arrangements are being made for outpatient INR checks with results to her PCP for continued dose adjustments as needed. She will also follow up with pulmonology as an outpatient.     Consults:   None    Disposition: home    Discharged Condition: Stable    Follow Up: PCP 1 week  Pulmonology 2-4 weeks  PT/INR 3/19    Diet: Diet Regular    Discharge Medications:   See computer generated medication reconciliation form    Time Spent on discharge is  32 minutes in patient examination, evaluation, patient/family counseling as well as medication reconciliation, prescriptions for required medications, discharge plan and follow up.     Electronically signed by ADDY Pate on 3/15/2021 at 13:20 CDT     I have discussed the care of Leo Wong,  as documented by ADDY Nielson, and the final version is my  approved version of the assessment.        Electronically signed by Dionte Gordon MD on 3/15/2021 at 19:26 CDT

## 2024-01-30 NOTE — PROGRESS NOTES
01/18/24 diabetic diet   Ensure supplement TID and prn       01/29/24 appetite improved, continue to supplement with boost       01/30/24 appetite improved   Evan Gordon M.D.  ADDY Nielson        Internal Medicine Progress Note    3/6/2021   15:21 CST    Name:  Leo Wong  MRN:    [id number]     Acct:     [id number]   Room:  [address] [place] [address] Day: 0     Admit Date: 2/8/2021  5:26 PM  PCP: Provider, No Known    Subjective:     C/C: weakness, shortness of breath    Interval History: Status: stayed the same.  Resting in bed.  No family at bedside.  Afebrile.  Continues to require supplemental oxygen at 3 L.  Progressing with therapy.  Complaining of daily lab draws, PT/INRs.  Anxious for discharge plan. Hopeful for transfer to [facility].         .Review of Systems   Constitutional: Positive for malaise/fatigue. Negative for chills, fever and weight loss.   HENT: Negative for congestion, hoarse voice and nosebleeds.    Eyes: Negative for blurred vision and photophobia.   Cardiovascular: Positive for dyspnea on exertion. Negative for chest pain, near-syncope and orthopnea.   Respiratory: Positive for shortness of breath. Negative for cough and sputum production.    Endocrine: Negative for cold intolerance and polyuria.   Hematologic/Lymphatic: Negative for adenopathy and bleeding problem.   Skin: Negative for dry skin, itching and rash.   Musculoskeletal: Negative for arthritis, back pain and joint pain.   Gastrointestinal: Negative for abdominal pain, diarrhea, heartburn, nausea and vomiting.   Genitourinary: Negative for dysuria and flank pain.   Neurological: Positive for weakness. Negative for dizziness, headaches, paresthesias and seizures.   Psychiatric/Behavioral: Negative for altered mental status and memory loss. The patient is not nervous/anxious.    Allergic/Immunologic: Negative for environmental allergies and hives.         Medications:     Allergies:   Allergies   Allergen Reactions    Poison Ivy Extract Unknown - Low Severity       Current Meds:   Current Facility-Administered Medications:     acetaminophen (TYLENOL) tablet 650 mg,  650 mg, Oral, Q4H PRN, Dionte Gordon MD    albuterol sulfate HFA (PROVENTIL HFA;VENTOLIN HFA;PROAIR HFA) inhaler 2 puff, 2 puff, Inhalation, Q4H PRN, Dionte Gordon MD    atorvastatin (LIPITOR) tablet 10 mg, 10 mg, Oral, Daily, Dionte Gordon MD    benzocaine-menthol (CHLORASEPTIC) lozenge 1 lozenge, 1 lozenge, Mouth/Throat, Q2H PRN, Dionte Gordon MD    bisacodyl (DULCOLAX) suppository 10 mg, 10 mg, Rectal, Daily PRN, Dionte Gordon MD    escitalopram (LEXAPRO) tablet 20 mg, 20 mg, Oral, Daily, Dionte Gordon MD    fluticasone (FLONASE) 50 MCG/ACT nasal spray 1 spray, 1 spray, Each Nare, BID PRN, Dionte Gordon MD    furosemide (LASIX) tablet 40 mg, 40 mg, Oral, Daily, Dionte Gordon MD    guaiFENesin (ROBITUSSIN) 100 MG/5ML oral solution 200 mg, 200 mg, Oral, Q4H PRN, Dionte Gordon MD    hydroCHLOROthiazide (HYDRODIURIL) tablet 25 mg, 25 mg, Oral, Daily, Dionte Gordon MD    hydrOXYzine (ATARAX) tablet 50 mg, 50 mg, Oral, TID PRN, Dionte Gordon MD    lisinopril (PRINIVIL,ZESTRIL) tablet 10 mg, 10 mg, Oral, Q24H, Dionte Gordon MD    melatonin tablet 6 mg, 6 mg, Oral, Nightly, Dionte Gordon MD    nitroglycerin (NITROSTAT) SL tablet 0.4 mg, 0.4 mg, Sublingual, Q5 Min PRN, Dionte Gordon MD    ondansetron (ZOFRAN) injection 4 mg, 4 mg, Intravenous, Q6H PRN, Dionte Gordon MD    ondansetron ODT (ZOFRAN-ODT) disintegrating tablet 4 mg, 4 mg, Oral, Q6H PRN, Dionte Gordon MD    oxymetazoline (AFRIN) nasal spray 2 spray, 2 spray, Each Nare, BID PRN, Dionte Gordon MD    phenol (CHLORASEPTIC) 1.4 % liquid 1 spray, 1 spray, Mouth/Throat, Q4H PRN, Dionte Gordon MD    polyethylene glycol (MIRALAX) packet 17 g, 17 g, Oral, Daily PRN, Dionte Gordno MD    potassium chloride (MICRO-K) CR capsule 40 mEq, 40 mEq, Oral, Daily, Chetna Randolph APRN    promethazine  "(PHENERGAN) 12.5 mg in sodium chloride 0.9 % 50 mL, 12.5 mg, Intravenous, Q6H PRN, Dionte Gordon MD    promethazine (PHENERGAN) tablet 12.5 mg, 12.5 mg, Oral, Q6H PRN, Dionte Gordon MD    sennosides-docusate (PERICOLACE) 8.6-50 MG per tablet 1 tablet, 1 tablet, Oral, Daily PRN, Dionte Gordon MD    sodium chloride nasal spray 1 spray, 1 spray, Each Nare, Daily PRN, Dionte Gordon MD    traZODone (DESYREL) tablet 150 mg, 150 mg, Oral, Nightly PRN, Dionte Gordon MD    warfarin (COUMADIN) tablet 7.5 mg, 7.5 mg, Oral, Daily, Chetna Randolph, APRN    Data:     Code Status:    There are no questions and answers to display.       No family history on file.    Social History     Socioeconomic History    Marital status: Single     Spouse name: Not on file    Number of children: Not on file    Years of education: Not on file    Highest education level: Not on file   Tobacco Use    Smoking status: Never Smoker       Vitals:  Ht 170.2 cm (67\")   Wt 102 kg (224 lb 1.6 oz)   BMI 35.10 kg/m²   T 97.3 HR 75 RR 20 /55 SpO2 97% (3 lpm)      I/O (24Hr):  No intake or output data in the 24 hours ending 03/06/21 1521    Labs and imaging:      Recent Results (from the past 12 hour(s))   Protime-INR    Collection Time: 03/06/21  4:37 AM    Specimen: Blood   Result Value Ref Range    Protime 26.6 (H) 11.9 - 14.6 Seconds    INR 2.46 (H) 0.91 - 1.09   Lavender Top    Collection Time: 03/06/21  4:37 AM   Result Value Ref Range    Extra Tube hold for add-on    Gold Top - SST    Collection Time: 03/06/21  4:37 AM   Result Value Ref Range    Extra Tube Hold for add-ons.            Physical Examination:        Physical Exam  Vitals and nursing note reviewed.   Constitutional:       Appearance: Normal appearance. She is well-developed.   HENT:      Head: Normocephalic and atraumatic.      Right Ear: External ear normal.      Left Ear: External ear normal.      Nose: Nose normal.      " Mouth/Throat:      Mouth: Mucous membranes are moist.   Eyes:      Conjunctiva/sclera: Conjunctivae normal.      Pupils: Pupils are equal, round, and reactive to light.   Cardiovascular:      Rate and Rhythm: Normal rate and regular rhythm.      Pulses: Normal pulses.      Heart sounds: Normal heart sounds.   Pulmonary:      Effort: Pulmonary effort is normal.      Comments: Diminished breath sounds bases  Abdominal:      General: Bowel sounds are normal. There is no distension.      Palpations: Abdomen is soft.   Musculoskeletal:         General: Normal range of motion.      Cervical back: Normal range of motion and neck supple.      Right lower leg: Edema present.      Left lower leg: Edema present.      Comments: Generalized weakness   Skin:     General: Skin is warm and dry.   Neurological:      General: No focal deficit present.      Mental Status: She is alert and oriented to person, place, and time.      Cranial Nerves: No cranial nerve deficit.   Psychiatric:         Mood and Affect: Mood normal.         Behavior: Behavior normal.         Thought Content: Thought content normal.           Assessment:            * No active hospital problems. *    Past Medical History:   Diagnosis Date    Depression     High cholesterol     Hypertension     Obesity     Pulmonary emboli (CMS/HCC)         Plan:        Acute hypoxic respiratory failure  Bilateral pulmonary emboli  S/p COVID-19 pneumonia  HTN  Chronic anticoagulation  HLD  Depression  Insomnia  Hypokalemia    Continue current treatment. Monitor counts. Increase activity. Continue oxygen weaning as tolerated. Labs Monday. Maintain patient safety. Continue anticoagulation and titrate dose as needed.  Complaining of multiple lab draws, INR has been therapeutic for the past 4 days, will omit INR in the a.m. and obtain on Monday.  Aggressive therapies as tolerated.  Discharge planning.     Electronically signed by ADDY Colindres on 3/6/2021 at 15:21 CST  I have  discussed the care of Leo Wong, including pertinent history and exam findings, with the nurse practitioner.    I have seen and examined the patient and the key elements of all parts of the encounter have been performed by me.  I agree with the assessment, plan and orders as documented by ADDY Coleman, after I modified the exam findings and the plan of treatments and the final version is my approved version of the assessment.        Electronically signed by Dionte Gordon MD on 3/6/2021 at 19:10 CST